# Patient Record
Sex: FEMALE | Race: ASIAN | NOT HISPANIC OR LATINO
[De-identification: names, ages, dates, MRNs, and addresses within clinical notes are randomized per-mention and may not be internally consistent; named-entity substitution may affect disease eponyms.]

---

## 2017-04-28 ENCOUNTER — RX RENEWAL (OUTPATIENT)
Age: 37
End: 2017-04-28

## 2017-05-10 ENCOUNTER — APPOINTMENT (OUTPATIENT)
Dept: ENDOCRINOLOGY | Facility: CLINIC | Age: 37
End: 2017-05-10

## 2017-11-15 ENCOUNTER — CLINICAL ADVICE (OUTPATIENT)
Age: 37
End: 2017-11-15

## 2017-11-15 ENCOUNTER — INPATIENT (INPATIENT)
Facility: HOSPITAL | Age: 37
LOS: 0 days | Discharge: HOME | End: 2017-11-16
Attending: INTERNAL MEDICINE | Admitting: INTERNAL MEDICINE
Payer: MEDICAID

## 2017-11-15 VITALS
RESPIRATION RATE: 18 BRPM | DIASTOLIC BLOOD PRESSURE: 72 MMHG | TEMPERATURE: 98 F | HEART RATE: 112 BPM | WEIGHT: 90.39 LBS | SYSTOLIC BLOOD PRESSURE: 140 MMHG

## 2017-11-15 DIAGNOSIS — E03.9 HYPOTHYROIDISM, UNSPECIFIED: ICD-10-CM

## 2017-11-15 DIAGNOSIS — E10.3599 TYPE 1 DIABETES MELLITUS WITH PROLIFERATIVE DIABETIC RETINOPATHY WITHOUT MACULAR EDEMA, UNSPECIFIED EYE: ICD-10-CM

## 2017-11-15 DIAGNOSIS — E13.10 OTHER SPECIFIED DIABETES MELLITUS WITH KETOACIDOSIS WITHOUT COMA: ICD-10-CM

## 2017-11-15 DIAGNOSIS — E10.10 TYPE 1 DIABETES MELLITUS WITH KETOACIDOSIS WITHOUT COMA: ICD-10-CM

## 2017-11-15 LAB
ALBUMIN SERPL ELPH-MCNC: 4.5 G/DL — SIGNIFICANT CHANGE UP (ref 3.3–5)
ALP SERPL-CCNC: 91 U/L — SIGNIFICANT CHANGE UP (ref 40–120)
ALT FLD-CCNC: 11 U/L — SIGNIFICANT CHANGE UP (ref 4–33)
APPEARANCE UR: CLEAR — SIGNIFICANT CHANGE UP
AST SERPL-CCNC: 22 U/L — SIGNIFICANT CHANGE UP (ref 4–32)
B-OH-BUTYR SERPL-SCNC: 7.2 MMOL/L — HIGH (ref 0–0.4)
BASE EXCESS BLDV CALC-SCNC: -14.8 MMOL/L — SIGNIFICANT CHANGE UP
BASE EXCESS BLDV CALC-SCNC: -7.7 MMOL/L — SIGNIFICANT CHANGE UP
BASOPHILS # BLD AUTO: 0.06 K/UL — SIGNIFICANT CHANGE UP (ref 0–0.2)
BASOPHILS NFR BLD AUTO: 0.4 % — SIGNIFICANT CHANGE UP (ref 0–2)
BILIRUB SERPL-MCNC: 0.3 MG/DL — SIGNIFICANT CHANGE UP (ref 0.2–1.2)
BILIRUB UR-MCNC: NEGATIVE — SIGNIFICANT CHANGE UP
BLOOD GAS VENOUS - CREATININE: 0.47 MG/DL — LOW (ref 0.5–1.3)
BLOOD GAS VENOUS - CREATININE: SIGNIFICANT CHANGE UP MG/DL (ref 0.5–1.3)
BLOOD UR QL VISUAL: HIGH
BUN SERPL-MCNC: 12 MG/DL — SIGNIFICANT CHANGE UP (ref 7–23)
BUN SERPL-MCNC: 15 MG/DL — SIGNIFICANT CHANGE UP (ref 7–23)
BUN SERPL-MCNC: 23 MG/DL — SIGNIFICANT CHANGE UP (ref 7–23)
CALCIUM SERPL-MCNC: 7.4 MG/DL — LOW (ref 8.4–10.5)
CALCIUM SERPL-MCNC: 7.9 MG/DL — LOW (ref 8.4–10.5)
CALCIUM SERPL-MCNC: 9.5 MG/DL — SIGNIFICANT CHANGE UP (ref 8.4–10.5)
CHLORIDE BLDV-SCNC: 108 MMOL/L — SIGNIFICANT CHANGE UP (ref 96–108)
CHLORIDE BLDV-SCNC: 114 MMOL/L — HIGH (ref 96–108)
CHLORIDE SERPL-SCNC: 106 MMOL/L — SIGNIFICANT CHANGE UP (ref 98–107)
CHLORIDE SERPL-SCNC: 107 MMOL/L — SIGNIFICANT CHANGE UP (ref 98–107)
CHLORIDE SERPL-SCNC: 96 MMOL/L — LOW (ref 98–107)
CO2 SERPL-SCNC: 15 MMOL/L — LOW (ref 22–31)
CO2 SERPL-SCNC: 19 MMOL/L — LOW (ref 22–31)
CO2 SERPL-SCNC: 9 MMOL/L — CRITICAL LOW (ref 22–31)
COLOR SPEC: SIGNIFICANT CHANGE UP
CREAT SERPL-MCNC: 0.47 MG/DL — LOW (ref 0.5–1.3)
CREAT SERPL-MCNC: 0.59 MG/DL — SIGNIFICANT CHANGE UP (ref 0.5–1.3)
CREAT SERPL-MCNC: 0.8 MG/DL — SIGNIFICANT CHANGE UP (ref 0.5–1.3)
EOSINOPHIL # BLD AUTO: 0 K/UL — SIGNIFICANT CHANGE UP (ref 0–0.5)
EOSINOPHIL NFR BLD AUTO: 0 % — SIGNIFICANT CHANGE UP (ref 0–6)
GAS PNL BLDV: 133 MMOL/L — LOW (ref 136–146)
GAS PNL BLDV: 134 MMOL/L — LOW (ref 136–146)
GLUCOSE BLDC GLUCOMTR-MCNC: 111 MG/DL — HIGH (ref 70–99)
GLUCOSE BLDC GLUCOMTR-MCNC: 131 MG/DL — HIGH (ref 70–99)
GLUCOSE BLDC GLUCOMTR-MCNC: 146 MG/DL — HIGH (ref 70–99)
GLUCOSE BLDC GLUCOMTR-MCNC: 148 MG/DL — HIGH (ref 70–99)
GLUCOSE BLDC GLUCOMTR-MCNC: 153 MG/DL — HIGH (ref 70–99)
GLUCOSE BLDC GLUCOMTR-MCNC: 184 MG/DL — HIGH (ref 70–99)
GLUCOSE BLDV-MCNC: 130 — HIGH (ref 70–99)
GLUCOSE BLDV-MCNC: 396 — HIGH (ref 70–99)
GLUCOSE SERPL-MCNC: 131 MG/DL — HIGH (ref 70–99)
GLUCOSE SERPL-MCNC: 178 MG/DL — HIGH (ref 70–99)
GLUCOSE SERPL-MCNC: 401 MG/DL — HIGH (ref 70–99)
GLUCOSE UR-MCNC: >1000 — SIGNIFICANT CHANGE UP
HCO3 BLDV-SCNC: 13 MMOL/L — LOW (ref 20–27)
HCO3 BLDV-SCNC: 17 MMOL/L — LOW (ref 20–27)
HCT VFR BLD CALC: 47.6 % — HIGH (ref 34.5–45)
HCT VFR BLDV CALC: 38.3 % — SIGNIFICANT CHANGE UP (ref 34.5–45)
HCT VFR BLDV CALC: 42.5 % — SIGNIFICANT CHANGE UP (ref 34.5–45)
HGB BLD-MCNC: 14.9 G/DL — SIGNIFICANT CHANGE UP (ref 11.5–15.5)
HGB BLDV-MCNC: 12.5 G/DL — SIGNIFICANT CHANGE UP (ref 11.5–15.5)
HGB BLDV-MCNC: 13.9 G/DL — SIGNIFICANT CHANGE UP (ref 11.5–15.5)
IMM GRANULOCYTES # BLD AUTO: 0.09 # — SIGNIFICANT CHANGE UP
IMM GRANULOCYTES NFR BLD AUTO: 0.6 % — SIGNIFICANT CHANGE UP (ref 0–1.5)
KETONES UR-MCNC: SIGNIFICANT CHANGE UP
LACTATE BLDV-MCNC: 1.5 MMOL/L — SIGNIFICANT CHANGE UP (ref 0.5–2)
LACTATE BLDV-MCNC: 2 MMOL/L — SIGNIFICANT CHANGE UP (ref 0.5–2)
LEUKOCYTE ESTERASE UR-ACNC: NEGATIVE — SIGNIFICANT CHANGE UP
LYMPHOCYTES # BLD AUTO: 0.94 K/UL — LOW (ref 1–3.3)
LYMPHOCYTES # BLD AUTO: 6.1 % — LOW (ref 13–44)
MAGNESIUM SERPL-MCNC: 1.4 MG/DL — LOW (ref 1.6–2.6)
MAGNESIUM SERPL-MCNC: 1.7 MG/DL — SIGNIFICANT CHANGE UP (ref 1.6–2.6)
MCHC RBC-ENTMCNC: 31.3 % — LOW (ref 32–36)
MCHC RBC-ENTMCNC: 31.4 PG — SIGNIFICANT CHANGE UP (ref 27–34)
MCV RBC AUTO: 100.2 FL — HIGH (ref 80–100)
MONOCYTES # BLD AUTO: 0.25 K/UL — SIGNIFICANT CHANGE UP (ref 0–0.9)
MONOCYTES NFR BLD AUTO: 1.6 % — LOW (ref 2–14)
NEUTROPHILS # BLD AUTO: 14.18 K/UL — HIGH (ref 1.8–7.4)
NEUTROPHILS NFR BLD AUTO: 91.3 % — HIGH (ref 43–77)
NITRITE UR-MCNC: NEGATIVE — SIGNIFICANT CHANGE UP
NRBC # FLD: 0 — SIGNIFICANT CHANGE UP
PCO2 BLDV: 35 MMHG — LOW (ref 41–51)
PCO2 BLDV: 39 MMHG — LOW (ref 41–51)
PH BLDV: 7.17 PH — CRITICAL LOW (ref 7.32–7.43)
PH BLDV: 7.28 PH — LOW (ref 7.32–7.43)
PH UR: 5.5 — SIGNIFICANT CHANGE UP (ref 4.6–8)
PHOSPHATE SERPL-MCNC: 2.1 MG/DL — LOW (ref 2.5–4.5)
PHOSPHATE SERPL-MCNC: 2.5 MG/DL — SIGNIFICANT CHANGE UP (ref 2.5–4.5)
PLATELET # BLD AUTO: 300 K/UL — SIGNIFICANT CHANGE UP (ref 150–400)
PMV BLD: 9 FL — SIGNIFICANT CHANGE UP (ref 7–13)
PO2 BLDV: 56 MMHG — HIGH (ref 35–40)
PO2 BLDV: < 24 MMHG — LOW (ref 35–40)
POTASSIUM BLDV-SCNC: 4.1 MMOL/L — SIGNIFICANT CHANGE UP (ref 3.4–4.5)
POTASSIUM BLDV-SCNC: 4.7 MMOL/L — HIGH (ref 3.4–4.5)
POTASSIUM SERPL-MCNC: 3.9 MMOL/L — SIGNIFICANT CHANGE UP (ref 3.5–5.3)
POTASSIUM SERPL-MCNC: 4.5 MMOL/L — SIGNIFICANT CHANGE UP (ref 3.5–5.3)
POTASSIUM SERPL-MCNC: 5.2 MMOL/L — SIGNIFICANT CHANGE UP (ref 3.5–5.3)
POTASSIUM SERPL-SCNC: 3.9 MMOL/L — SIGNIFICANT CHANGE UP (ref 3.5–5.3)
POTASSIUM SERPL-SCNC: 4.5 MMOL/L — SIGNIFICANT CHANGE UP (ref 3.5–5.3)
POTASSIUM SERPL-SCNC: 5.2 MMOL/L — SIGNIFICANT CHANGE UP (ref 3.5–5.3)
PROT SERPL-MCNC: 8.7 G/DL — HIGH (ref 6–8.3)
PROT UR-MCNC: 20 — SIGNIFICANT CHANGE UP
RBC # BLD: 4.75 M/UL — SIGNIFICANT CHANGE UP (ref 3.8–5.2)
RBC # FLD: 11.6 % — SIGNIFICANT CHANGE UP (ref 10.3–14.5)
RBC CASTS # UR COMP ASSIST: >50 — HIGH (ref 0–?)
SAO2 % BLDV: 26.1 % — LOW (ref 60–85)
SAO2 % BLDV: 83.6 % — SIGNIFICANT CHANGE UP (ref 60–85)
SODIUM SERPL-SCNC: 133 MMOL/L — LOW (ref 135–145)
SODIUM SERPL-SCNC: 137 MMOL/L — SIGNIFICANT CHANGE UP (ref 135–145)
SODIUM SERPL-SCNC: 140 MMOL/L — SIGNIFICANT CHANGE UP (ref 135–145)
SP GR SPEC: 1.03 — SIGNIFICANT CHANGE UP (ref 1–1.03)
SQUAMOUS # UR AUTO: SIGNIFICANT CHANGE UP
TROPONIN T SERPL-MCNC: < 0.06 NG/ML — SIGNIFICANT CHANGE UP (ref 0–0.06)
UROBILINOGEN FLD QL: NORMAL E.U. — SIGNIFICANT CHANGE UP (ref 0.1–0.2)
WBC # BLD: 15.52 K/UL — HIGH (ref 3.8–10.5)
WBC # FLD AUTO: 15.52 K/UL — HIGH (ref 3.8–10.5)
WBC UR QL: SIGNIFICANT CHANGE UP (ref 0–?)

## 2017-11-15 PROCEDURE — 71020: CPT | Mod: 26

## 2017-11-15 PROCEDURE — 99255 IP/OBS CONSLTJ NEW/EST HI 80: CPT | Mod: GC

## 2017-11-15 PROCEDURE — 99291 CRITICAL CARE FIRST HOUR: CPT

## 2017-11-15 RX ORDER — SODIUM CHLORIDE 9 MG/ML
1000 INJECTION, SOLUTION INTRAVENOUS ONCE
Qty: 0 | Refills: 0 | Status: COMPLETED | OUTPATIENT
Start: 2017-11-15 | End: 2017-11-15

## 2017-11-15 RX ORDER — INSULIN HUMAN 100 [IU]/ML
3 INJECTION, SOLUTION SUBCUTANEOUS
Qty: 100 | Refills: 0 | Status: DISCONTINUED | OUTPATIENT
Start: 2017-11-15 | End: 2017-11-16

## 2017-11-15 RX ORDER — ATORVASTATIN CALCIUM 80 MG/1
1 TABLET, FILM COATED ORAL
Qty: 0 | Refills: 0 | COMMUNITY

## 2017-11-15 RX ORDER — SODIUM CHLORIDE 9 MG/ML
1000 INJECTION, SOLUTION INTRAVENOUS
Qty: 0 | Refills: 0 | Status: DISCONTINUED | OUTPATIENT
Start: 2017-11-15 | End: 2017-11-16

## 2017-11-15 RX ORDER — INSULIN LISPRO 100/ML
VIAL (ML) SUBCUTANEOUS
Qty: 0 | Refills: 0 | Status: DISCONTINUED | OUTPATIENT
Start: 2017-11-15 | End: 2017-11-16

## 2017-11-15 RX ORDER — SODIUM CHLORIDE 9 MG/ML
2000 INJECTION, SOLUTION INTRAVENOUS ONCE
Qty: 0 | Refills: 0 | Status: DISCONTINUED | OUTPATIENT
Start: 2017-11-15 | End: 2017-11-15

## 2017-11-15 RX ORDER — ENOXAPARIN SODIUM 100 MG/ML
40 INJECTION SUBCUTANEOUS EVERY 24 HOURS
Qty: 0 | Refills: 0 | Status: DISCONTINUED | OUTPATIENT
Start: 2017-11-15 | End: 2017-11-16

## 2017-11-15 RX ORDER — DEXTROSE 50 % IN WATER 50 %
12.5 SYRINGE (ML) INTRAVENOUS ONCE
Qty: 0 | Refills: 0 | Status: DISCONTINUED | OUTPATIENT
Start: 2017-11-15 | End: 2017-11-16

## 2017-11-15 RX ORDER — GLUCAGON INJECTION, SOLUTION 0.5 MG/.1ML
1 INJECTION, SOLUTION SUBCUTANEOUS ONCE
Qty: 0 | Refills: 0 | Status: DISCONTINUED | OUTPATIENT
Start: 2017-11-15 | End: 2017-11-16

## 2017-11-15 RX ORDER — DEXTROSE 50 % IN WATER 50 %
25 SYRINGE (ML) INTRAVENOUS ONCE
Qty: 0 | Refills: 0 | Status: DISCONTINUED | OUTPATIENT
Start: 2017-11-15 | End: 2017-11-16

## 2017-11-15 RX ORDER — INSULIN LISPRO 100/ML
6 VIAL (ML) SUBCUTANEOUS
Qty: 0 | Refills: 0 | Status: DISCONTINUED | OUTPATIENT
Start: 2017-11-15 | End: 2017-11-16

## 2017-11-15 RX ORDER — INSULIN GLARGINE 100 [IU]/ML
20 INJECTION, SOLUTION SUBCUTANEOUS AT BEDTIME
Qty: 0 | Refills: 0 | Status: DISCONTINUED | OUTPATIENT
Start: 2017-11-15 | End: 2017-11-16

## 2017-11-15 RX ORDER — SODIUM CHLORIDE 9 MG/ML
2000 INJECTION INTRAMUSCULAR; INTRAVENOUS; SUBCUTANEOUS ONCE
Qty: 0 | Refills: 0 | Status: COMPLETED | OUTPATIENT
Start: 2017-11-15 | End: 2017-11-15

## 2017-11-15 RX ORDER — ATORVASTATIN CALCIUM 80 MG/1
80 TABLET, FILM COATED ORAL AT BEDTIME
Qty: 0 | Refills: 0 | Status: DISCONTINUED | OUTPATIENT
Start: 2017-11-15 | End: 2017-11-16

## 2017-11-15 RX ORDER — DEXTROSE 50 % IN WATER 50 %
1 SYRINGE (ML) INTRAVENOUS ONCE
Qty: 0 | Refills: 0 | Status: DISCONTINUED | OUTPATIENT
Start: 2017-11-15 | End: 2017-11-16

## 2017-11-15 RX ORDER — SODIUM CHLORIDE 9 MG/ML
1000 INJECTION, SOLUTION INTRAVENOUS
Qty: 0 | Refills: 0 | Status: DISCONTINUED | OUTPATIENT
Start: 2017-11-15 | End: 2017-11-15

## 2017-11-15 RX ORDER — INSULIN HUMAN 100 [IU]/ML
4 INJECTION, SOLUTION SUBCUTANEOUS
Qty: 100 | Refills: 0 | Status: DISCONTINUED | OUTPATIENT
Start: 2017-11-15 | End: 2017-11-15

## 2017-11-15 RX ADMIN — SODIUM CHLORIDE 2000 MILLILITER(S): 9 INJECTION INTRAMUSCULAR; INTRAVENOUS; SUBCUTANEOUS at 14:34

## 2017-11-15 RX ADMIN — INSULIN HUMAN 2 UNIT(S)/HR: 100 INJECTION, SOLUTION SUBCUTANEOUS at 17:50

## 2017-11-15 RX ADMIN — ENOXAPARIN SODIUM 40 MILLIGRAM(S): 100 INJECTION SUBCUTANEOUS at 18:50

## 2017-11-15 RX ADMIN — INSULIN HUMAN 3 UNIT(S)/HR: 100 INJECTION, SOLUTION SUBCUTANEOUS at 21:57

## 2017-11-15 RX ADMIN — INSULIN HUMAN 4 UNIT(S)/HR: 100 INJECTION, SOLUTION SUBCUTANEOUS at 15:30

## 2017-11-15 RX ADMIN — SODIUM CHLORIDE 125 MILLILITER(S): 9 INJECTION, SOLUTION INTRAVENOUS at 17:50

## 2017-11-15 RX ADMIN — Medication 5 MILLIGRAM(S): at 18:50

## 2017-11-15 RX ADMIN — SODIUM CHLORIDE 125 MILLILITER(S): 9 INJECTION, SOLUTION INTRAVENOUS at 18:51

## 2017-11-15 RX ADMIN — SODIUM CHLORIDE 2000 MILLILITER(S): 9 INJECTION, SOLUTION INTRAVENOUS at 20:34

## 2017-11-15 RX ADMIN — ATORVASTATIN CALCIUM 80 MILLIGRAM(S): 80 TABLET, FILM COATED ORAL at 22:09

## 2017-11-15 RX ADMIN — INSULIN HUMAN 2 UNIT(S)/HR: 100 INJECTION, SOLUTION SUBCUTANEOUS at 18:51

## 2017-11-15 RX ADMIN — SODIUM CHLORIDE 200 MILLILITER(S): 9 INJECTION, SOLUTION INTRAVENOUS at 20:35

## 2017-11-15 RX ADMIN — SODIUM CHLORIDE 2000 MILLILITER(S): 9 INJECTION, SOLUTION INTRAVENOUS at 20:33

## 2017-11-15 RX ADMIN — INSULIN GLARGINE 20 UNIT(S): 100 INJECTION, SOLUTION SUBCUTANEOUS at 23:39

## 2017-11-15 RX ADMIN — INSULIN HUMAN 2 UNIT(S)/HR: 100 INJECTION, SOLUTION SUBCUTANEOUS at 20:35

## 2017-11-15 NOTE — CONSULT NOTE ADULT - ASSESSMENT
37 y/o F with uncontrolled DM1, hypothyroidism, HLD, here with DKA likely due to lack of insulin delivery via pump (as pump site was not changed appropriately)

## 2017-11-15 NOTE — H&P ADULT - NSHPPHYSICALEXAM_GEN_ALL_CORE
T(C): 36.8 (11-15-17 @ 13:36), Max: 36.8 (11-15-17 @ 13:36)  HR: 113 (11-15-17 @ 14:43) (112 - 113)  BP: 138/86 (11-15-17 @ 14:43) (138/86 - 140/72)  RR: 18 (11-15-17 @ 14:43) (18 - 18)  SpO2: 100% (11-15-17 @ 14:43) (100% - 100%)    Gen: awake, alert  HENT: neck soft / supple; MMM  Lymph: no LAD noted in neck  Eye: PERRL, sclerae anicteric  CV: normal rate, regular rhythm  Pulm: CTAB, no w/r/r auscultated  Abd: +BS, soft, NT, ND  Skin: warm, dry  Ext: no LE edema  Neuro: answering questions appropriately, following commands appropriately, recent and remote memory intact  Psych: normal mood / affect

## 2017-11-15 NOTE — CONSULT NOTE ADULT - PROBLEM SELECTOR RECOMMENDATION 2
- patient with poorly controlled DM1  - admit to MICU on insulin gtt for DKA  - will resume insulin pump once DKA resolved

## 2017-11-15 NOTE — H&P ADULT - ATTENDING COMMENTS
Patient with hx as above p/w DKA after insulin pump malfunction. AG 28, glucose 400s, HCO3 9, mild leukocytosis, normal lactate. She is afebrile. BP 120s-140s systolic. She is awake and alert, appears hypovolemic on exam. She received a 2L bolus in the ED and was initiated on an insulin gtt.    Impression  1.) Type 1 DM with DKA  2.) AGMA secondary to above.  3.) Hypovolemia secondary to above.    Plan  1.) Cont insulin gtt as per protocol until AG normalizes.  2.) Monitor finger sticks Q1H until off the insulin gtt.  3.) Once finger stick < 250, change maintenance IVF to dextrose.  4.) Will give another 2 liters of IVF bolus overnight.  5.) NPO until AG closes, advance diet afterwards.  6.) Transition to SC insulin once AG has closed, f/u endocrine recs.  7.) Pan culture if spikes a temp.  8.) Admit to the MICU for further management of above.  9.) Full code.    35 minutes of critical care time exclusive of all procedures.

## 2017-11-15 NOTE — ED ADULT NURSE NOTE - OBJECTIVE STATEMENT
pt presents to ED hx of kidney disease and DM, currently on an insulin pump. has had ICU admission for DKA in the past. pt states she was on the train yesterday when she began to feel ill, nauseated, tired, and checked her BGL and it was in the 400's. pt sates she did not adjust her basil rate changed her infusion site and the BGL was still in the 400's today, came to ED today d/t hyperglycemia general malaise and nausea. pt denies LOC denies abd pain on palpatino + thurst, denies dysuria. very difficult IV access. IV 24 G R hand labs drawn, TQ removed NS infusing. pt states she turned off her pump when she came to the ED today.

## 2017-11-15 NOTE — ED PROVIDER NOTE - ATTENDING CONTRIBUTION TO CARE
agree with resident note  36 yr old female with hx of IDDM presents with following hx; has insulin pump and continuous glucose monitor.  the monitor became dysfunctional approximately 1 month ago.  Pt was not frequently checking her fingersticks.  2 days ago started having dizziness, vomiting, not feeling well.  this continued until this morning when she checked her FS at work and was above 400.    PE: not toxic appearing; VSS; dry mucous membranes; CTAB/L; s1 s2 no m/r/g abd soft/NT/ND ext: no edema

## 2017-11-15 NOTE — CONSULT NOTE ADULT - ATTENDING COMMENTS
36F DM1 uncontrolled p/w DKA while using insulin pump. Per history patient not changing infusion site regularly which likely led to inadequate receipt of insulin from pump. For now agree with IV insulin, DKA protocol per MICU. Once stabilized transition to basal bolus insulin. Resume insulin pump for dc. DM education on proper use of insulin pump.

## 2017-11-15 NOTE — ED ADULT TRIAGE NOTE - CHIEF COMPLAINT QUOTE
Pt with DM type 1, c/o high blood sugars and vomiting since yesterday. Denies pain.  in triage. Pt states she was also told her HA1C is over 9 per latest labs. Pt has insulin pump. Unable to obtain O2 sat in triage, pt denies SOB, no respiratory distress noted

## 2017-11-15 NOTE — CONSULT NOTE ADULT - SUBJECTIVE AND OBJECTIVE BOX
HPI:  35 y/o F h/o DM1 (with insulin pump), hypothyroidism, HLD here with N/V.    The patient is seen by Dr. Lara.  She has not seen her in about a year.  She had started an insulin pump 1 year ago.  She states that she changes her pump every 3-5 days, but she reports that she did not change her pump for 8 days.  She changed it last night.  The patient also reports that she used to do CGM but that her monitor broke a few months ago.  Since then, she has only been checking her FS 2-3 times per week, and her FS have been in the 200s when she has checked.  On her way to work today, she began to feel nauseous, with emesis x3 and decided to present to the ED. Patient admitted to MICU for DKA.    Endocrine history:  Patient was diagnosed with DM1 when she was 3-4 years old. Has been using Animus pump for a little over a year. Her endocrinologist is Dr. Rodriguez, but has not seen her in over a year. Hba1c 9.2. Patient had a Dexcom sensor, however it broke a month ago. Since then, has been checking FS 2-3x a day, usually in the middle of the day before a meal, FS usually in 200's. Saw optho, has retinopathy. Has nephropathy but no neuropathy.     Patient has not changed her pump site in 8 days. Yesterday evening, she noticed her pump site was painful, and then switched the site. FS this AM was almost 500, patient attempted to correct with insulin pump, but had nausea/vomiting. She called our office and we instructed her to come to ED. No longer wearing insulin pump.     Pump settings:  Basal: 0.75 units/hr  ISF: 1:40  ICR: 1:7  Blood glucose target 120    Patient has been estimating how much carbs she is eating and then manually bolusing with her pump.     No longer on synthroid (was previously on 25 mcg, but was discontinued as TSH was 3.19 and antibodies were negative.      PAST MEDICAL & SURGICAL HISTORY:  Hyperlipidemia, unspecified hyperlipidemia  Acquired hypothyroidism  Diabetes mellitus type I  No significant past surgical history      FAMILY HISTORY:  Family history of diabetes mellitus 2 in father (Father)      Social History:  Former smoker, quit years ago  No alcohol use  Works as a     Outpatient Medications:  · 	levofloxacin 500 mg oral tablet: 1 tab(s) orally every 24 hours x 1 day   · 	acetaminophen 325 mg oral tablet: 2 tab(s) orally every 6 hours, As needed, For Temp over 37.9 C (100.2 F)  · 	potassium phosphate-sodium phosphate 305 mg-700 mg oral tablet: 1 tab(s) orally 4 times a day (with meals and at bedtime)  · 	HumaLOG 100 units/mL subcutaneous solution: 10 unit(s) subcutaneous 3 times a day (before meals)  · 	Synthroid 25 mcg (0.025 mg) oral tablet: 1 tab(s) orally once a day  · 	Lantus 100 units/mL subcutaneous solution: 20 unit(s) subcutaneous once a day in the morning     MEDICATIONS  (STANDING):  insulin Infusion 4 Unit(s)/Hr (4 mL/Hr) IV Continuous <Continuous>    MEDICATIONS  (PRN):      Allergies  No Known Allergies        Review of Systems:  Constitutional: No fever  Eyes: No blurry vision  Neuro: No tremors  HEENT: No pain  Cardiovascular: No chest pain, palpitations  Respiratory: No SOB, no cough  GI: + nausea, vomiting; no abdominal pain  : No dysuria  Skin: no rash  Psych: no depression  Endocrine: no polyuria, + polydipsia  Hem/lymph: no swelling      ALL OTHER SYSTEMS REVIEWED AND NEGATIVE      PHYSICAL EXAM:  VITALS: T(C): 36.8 (11-15-17 @ 13:36)  T(F): 98.3 (11-15-17 @ 13:36), Max: 98.3 (11-15-17 @ 13:36)  HR: 113 (11-15-17 @ 14:43) (112 - 113)  BP: 138/86 (11-15-17 @ 14:43) (138/86 - 140/72)  RR:  (18 - 18)  SpO2:  (100% - 100%)  Wt(kg): --  GENERAL: NAD, well-developed  EYES: No proptosis, anicteric  HEENT:  Atraumatic, Normocephalic  RESPIRATORY: Clear to auscultation bilaterally; No rales, rhonchi, wheezing  CARDIOVASCULAR: Regular rate and rhythm; No murmurs; no peripheral edema  GI: Soft, nontender, non distended, normal bowel sounds  SKIN: Dry, intact; insulin pump site on right hip firm to palpation  MUSCULOSKELETAL: Full range of motion, normal strength  NEURO: extraocular movements intact, no tremor  PSYCH: Alert and oriented x 3, reactive affect, euthymic mood        POCT Blood Glucose.: 410 mg/dL (11-15-17 @ 13:33)                          14.9   15.52 )-----------( 300      ( 15 Nov 2017 14:34 )             47.6       11-15    133<L>  |  96<L>  |  23  ----------------------------<  401<H>  5.2   |  9<LL>  |  0.80    EGFR if : 109  EGFR if non : 94    Ca    9.5      11-15    TPro  8.7<H>  /  Alb  4.5  /  TBili  0.3  /  DBili  x   /  AST  22  /  ALT  11  /  AlkPhos  91  11-15      Thyroid Function Tests: HPI:  35 y/o F h/o DM1 (with insulin pump), hypothyroidism, HLD here with N/V.    The patient is seen by Dr. Rodriguez.  She has not seen her in about a year.  She had started an insulin pump 1 year ago.  She states that she changes her pump every 3-5 days, but she reports that she did not change her pump for 8 days.  She changed it last night.  The patient also reports that she used to do CGM but that her monitor broke a few months ago.  Since then, she has only been checking her FS 2-3 times per week, and her FS have been in the 200s when she has checked.  On her way to work today, she began to feel nauseous, with emesis x3 and decided to present to the ED. Patient admitted to MICU for DKA.    Endocrine history:  Patient was diagnosed with DM1 when she was 3-4 years old. Has been using Animus pump for a little over a year. Her endocrinologist is Dr. Rodriguez, but has not seen her in over a year. Hba1c 9.2. Patient had a Dexcom sensor, however it broke a month ago. Since then, has been checking FS 2-3x a day, usually in the middle of the day before a meal, FS usually in 200's. Saw optho, has retinopathy. Has nephropathy but no neuropathy.     Patient has not changed her pump site in 8 days. Yesterday evening, she noticed her pump site was painful, and then switched the site. FS this AM was almost 500, patient attempted to correct with insulin pump, but had nausea/vomiting. She called our office and we instructed her to come to ED. No longer wearing insulin pump.     Pump settings:  Basal: 0.75 units/hr  ISF: 1:40  ICR: 1:7  Blood glucose target 120    Patient has been estimating how much carbs she is eating and then manually bolusing with her pump.     No longer on synthroid (was previously on 25 mcg, but was discontinued as TSH was 3.19 and antibodies were negative.      PAST MEDICAL & SURGICAL HISTORY:  Hyperlipidemia, unspecified hyperlipidemia  Acquired hypothyroidism  Diabetes mellitus type I  No significant past surgical history      FAMILY HISTORY:  Family history of diabetes mellitus 2 in father (Father)      Social History:  Former smoker, quit years ago  No alcohol use  Works as a     Outpatient Medications:  · 	levofloxacin 500 mg oral tablet: 1 tab(s) orally every 24 hours x 1 day   · 	acetaminophen 325 mg oral tablet: 2 tab(s) orally every 6 hours, As needed, For Temp over 37.9 C (100.2 F)  · 	potassium phosphate-sodium phosphate 305 mg-700 mg oral tablet: 1 tab(s) orally 4 times a day (with meals and at bedtime)  · 	HumaLOG 100 units/mL subcutaneous solution: 10 unit(s) subcutaneous 3 times a day (before meals)  · 	Synthroid 25 mcg (0.025 mg) oral tablet: 1 tab(s) orally once a day  · 	Lantus 100 units/mL subcutaneous solution: 20 unit(s) subcutaneous once a day in the morning     MEDICATIONS  (STANDING):  insulin Infusion 4 Unit(s)/Hr (4 mL/Hr) IV Continuous <Continuous>    MEDICATIONS  (PRN):      Allergies  No Known Allergies        Review of Systems:  Constitutional: No fever  Eyes: No blurry vision  Neuro: No tremors  HEENT: No pain  Cardiovascular: No chest pain, palpitations  Respiratory: No SOB, no cough  GI: + nausea, vomiting; no abdominal pain  : No dysuria  Skin: no rash  Psych: no depression  Endocrine: no polyuria, + polydipsia  Hem/lymph: no swelling      ALL OTHER SYSTEMS REVIEWED AND NEGATIVE      PHYSICAL EXAM:  VITALS: T(C): 36.8 (11-15-17 @ 13:36)  T(F): 98.3 (11-15-17 @ 13:36), Max: 98.3 (11-15-17 @ 13:36)  HR: 113 (11-15-17 @ 14:43) (112 - 113)  BP: 138/86 (11-15-17 @ 14:43) (138/86 - 140/72)  RR:  (18 - 18)  SpO2:  (100% - 100%)  Wt(kg): --  GENERAL: NAD, well-developed  EYES: No proptosis, anicteric  HEENT:  Atraumatic, Normocephalic  RESPIRATORY: Clear to auscultation bilaterally; No rales, rhonchi, wheezing  CARDIOVASCULAR: Regular rate and rhythm; No murmurs; no peripheral edema  GI: Soft, nontender, non distended, normal bowel sounds  SKIN: Dry, intact; insulin pump site on right hip firm to palpation  MUSCULOSKELETAL: Full range of motion, normal strength  NEURO: extraocular movements intact, no tremor  PSYCH: Alert and oriented x 3, reactive affect, euthymic mood        POCT Blood Glucose.: 410 mg/dL (11-15-17 @ 13:33)                          14.9   15.52 )-----------( 300      ( 15 Nov 2017 14:34 )             47.6       11-15    133<L>  |  96<L>  |  23  ----------------------------<  401<H>  5.2   |  9<LL>  |  0.80    EGFR if : 109  EGFR if non : 94    Ca    9.5      11-15    TPro  8.7<H>  /  Alb  4.5  /  TBili  0.3  /  DBili  x   /  AST  22  /  ALT  11  /  AlkPhos  91  11-15      Thyroid Function Tests:

## 2017-11-15 NOTE — CONSULT NOTE ADULT - PROBLEM SELECTOR PROBLEM 2
Uncontrolled type 1 diabetes mellitus with proliferative retinopathy, unspecified laterality, unspecified proliferative retinopathy type

## 2017-11-15 NOTE — H&P ADULT - HISTORY OF PRESENT ILLNESS
36F h/o DM2 (on insulin pump) here with N/V/abd pain.    The patient is seen by     In the ED, Tmax 98.3F, -113, -140/72-86, SpO2 100% RA.  WBC 15.52, Hgb 14.9, plt 300, Na 133, Cl 96, bicarb 9, AG 28, Cr 0.80, glc 401. 36F h/o DM2 (on insulin pump) here with N/V/abd pain.    The patient is seen by Dr. Lara.  She has not seen her in about a year.  She had started an insulin pump 1 year ago.  She states that she changes her pump every 3-5 days, but she reports that she did not change her pump for 8 days.  She changed it last night.  The patient also reports that she used to do CGM but that her monitor broke a few months ago.  Since then, she has only been checking her FS 2-3 times per week, and her FS have been in the 200s when she has checked.  On her way to work today, she began to feel nauseous, with emesis and abdominal pain and decided to present to the ED.    In the ED, Tmax 98.3F, -113, -140/72-86, SpO2 100% RA.  WBC 15.52, Hgb 14.9, plt 300, Na 133, Cl 96, bicarb 9, AG 28, Cr 0.80, glc 401. 36F h/o DM1 (with insulin pump), hypothyroidism, HLD here with N/V/abd pain.    The patient is seen by Dr. Lara.  She has not seen her in about a year.  She had started an insulin pump 1 year ago.  She states that she changes her pump every 3-5 days, but she reports that she did not change her pump for 8 days.  She changed it last night.  The patient also reports that she used to do CGM but that her monitor broke a few months ago.  Since then, she has only been checking her FS 2-3 times per week, and her FS have been in the 200s when she has checked.  On her way to work today, she began to feel nauseous, with emesis and abdominal pain and decided to present to the ED.    In the ED, Tmax 98.3F, -113, -140/72-86, SpO2 100% RA.  WBC 15.52, Hgb 14.9, plt 300, Na 133, Cl 96, bicarb 9, AG 28, Cr 0.80, glc 401. 36F h/o DM1 (with insulin pump), hypothyroidism, HLD here with N/V.    The patient is seen by Dr. Lara.  She has not seen her in about a year.  She had started an insulin pump 1 year ago.  She states that she changes her pump every 3-5 days, but she reports that she did not change her pump for 8 days.  She changed it last night.  The patient also reports that she used to do CGM but that her monitor broke a few months ago.  Since then, she has only been checking her FS 2-3 times per week, and her FS have been in the 200s when she has checked.  On her way to work today, she began to feel nauseous, with emesis x3 and decided to present to the ED.    In the ED, Tmax 98.3F, -113, -140/72-86, SpO2 100% RA.  WBC 15.52, Hgb 14.9, plt 300, Na 133, Cl 96, bicarb 9, AG 28, Cr 0.80, glc 401.

## 2017-11-15 NOTE — ED PROVIDER NOTE - MEDICAL DECISION MAKING DETAILS
35yo female h/o DM1, h/o DKA with vomiting and hyperglycemia, likely DKA, labs, fluids, insulin and MICU if in DKA

## 2017-11-15 NOTE — H&P ADULT - NSHPREVIEWOFSYSTEMS_GEN_ALL_CORE
Gen: denies fever, chills  HENT: denies throat pain, nasal congestion  Eye: denies changes in vision, eye pain  CV: denies chest pain, palpitations  Pulm: denies SOB, cough  Abd: + abd pain, N/V, denies D/C  : denies hematuria, dysuria  Skin: denies rash, itching  Ext: denies LE edema, pain  Neuro: denies HA, dizziness, lightheadedness Gen: denies fever, chills  HENT: denies throat pain, nasal congestion  Eye: denies changes in vision, eye pain  CV: denies chest pain, palpitations  Pulm: denies SOB, cough  Abd: + N/V, denies abd pain, D/C  : denies hematuria, dysuria  Skin: denies rash, itching  Ext: denies LE edema, pain  Neuro: denies HA, dizziness, lightheadedness

## 2017-11-15 NOTE — ED PROVIDER NOTE - CRITICAL CARE PROVIDED
direct patient care (not related to procedure)/interpretation of diagnostic studies/consultation with other physicians/additional history taking

## 2017-11-15 NOTE — ED ADULT NURSE REASSESSMENT NOTE - NS ED NURSE REASSESS COMMENT FT1
RN Break Coverage : Alert and oriented x 4. Pt received from TOOTIE Herring . Pt being admitted to ICU. VSS. TOOTIE Piper in ICU took report.

## 2017-11-15 NOTE — H&P ADULT - NSHPLABSRESULTS_GEN_ALL_CORE
.  Labs reviewed personally.                          14.9   15.52 )-----------( 300      ( 15 Nov 2017 14:34 )             47.6     Hgb Trend: 14.9<--  11-15    133<L>  |  96<L>  |  23  ----------------------------<  401<H>  5.2   |  9<LL>  |  0.80    Ca    9.5      15 Nov 2017 14:34    TPro  8.7<H>  /  Alb  4.5  /  TBili  0.3  /  DBili  x   /  AST  22  /  ALT  11  /  AlkPhos  91  11-15    Creatinine Trend: 0.80<--        No imaging.

## 2017-11-15 NOTE — CONSULT NOTE ADULT - PROBLEM SELECTOR RECOMMENDATION 9
- patient with DKA likely in setting on non-adherence - she should be changing her insulin pump site every 3 days, but did not change it for 8 days - it is likely that she was not receiving insulin via the tubing and her pump site does not appear to be intact as well  - given elevated AG and + BHB, recommend admit to MICU on insulin gtt as per DKA protocol  - check Fs q1 with BMP q4 hours; once anion gap closed x 2, will transition patient back to insulin pump  - will follow  - outpatient follow up with Dr. Rodriguez - [phone] - patient with DKA likely in setting on non-adherence - she should be changing her insulin pump site every 3 days, but did not change it for 8 days - it is likely that she was not receiving insulin via the tubing and her pump site does not appear to be intact as well  - given elevated AG and + BHB, recommend admit to MICU on insulin gtt as per DKA protocol  - check Fs q1 with BMP q4 hours; once anion gap closed x 2, will transition patient to basal bolus insulin and then back to insulin pump  - will follow  - outpatient follow up with Dr. Rodriguez - [phone]

## 2017-11-15 NOTE — H&P ADULT - ASSESSMENT
36F h/o DM1 (with insulin pump), hypothyroidism, HLD here with N/V/abd pain found to be in DKA.    #Neuro - mentating well, no acute issues    #CV - HDS    #Resp - no acute issues    #GI - NPO at this time    #Renal - Cr WNL    #ID - no active issues    #Endo - pt in DKA, will start on insulin drip at this time    #Heme - leukocytosis in the setting of DKA    #FEN - NPO while on insulin drip    #DVT PPX - enoxaparin    #Code Status - full code

## 2017-11-15 NOTE — ED PROVIDER NOTE - OBJECTIVE STATEMENT
35yo female h/o DM1 with insulin pump but has not had her continuous glucose monitor on, and has not been checking fingersticks consistently. Yesterday was vomiting, + intermittent abdominal pain. Checked fingerstick today and was 498. pt disconnected her insulin pump. + feeling hot, but no fevers, chills. + intermittent cough x 1 month nonproductive. No dysuria. + etoh use this weekend   Endo - Dr Rosmery Rodriguez - [phone] 37yo female h/o DM1 with insulin pump but has not had her continuous glucose monitor on, and has not been checking fingersticks consistently. Yesterday was vomiting, + intermittent abdominal pain. Checked fingerstick today and was 498. pt disconnected her insulin pump. + feeling hot, but no fevers, chills. + intermittent cough x 1 month nonproductive. No dysuria. + etoh use this weekend   Endo - Dr Rosmery Rodriguez - [phone]    meds: insulin, enalapril, simvastatin

## 2017-11-16 ENCOUNTER — TRANSCRIPTION ENCOUNTER (OUTPATIENT)
Age: 37
End: 2017-11-16

## 2017-11-16 VITALS
DIASTOLIC BLOOD PRESSURE: 52 MMHG | OXYGEN SATURATION: 98 % | RESPIRATION RATE: 20 BRPM | SYSTOLIC BLOOD PRESSURE: 100 MMHG | HEART RATE: 86 BPM

## 2017-11-16 LAB
ALBUMIN SERPL ELPH-MCNC: 3.1 G/DL — LOW (ref 3.3–5)
ALP SERPL-CCNC: 54 U/L — SIGNIFICANT CHANGE UP (ref 40–120)
ALT FLD-CCNC: 7 U/L — SIGNIFICANT CHANGE UP (ref 4–33)
AST SERPL-CCNC: 12 U/L — SIGNIFICANT CHANGE UP (ref 4–32)
BASOPHILS # BLD AUTO: 0.02 K/UL — SIGNIFICANT CHANGE UP (ref 0–0.2)
BASOPHILS NFR BLD AUTO: 0.2 % — SIGNIFICANT CHANGE UP (ref 0–2)
BILIRUB SERPL-MCNC: 0.4 MG/DL — SIGNIFICANT CHANGE UP (ref 0.2–1.2)
BUN SERPL-MCNC: 14 MG/DL — SIGNIFICANT CHANGE UP (ref 7–23)
CALCIUM SERPL-MCNC: 7.8 MG/DL — LOW (ref 8.4–10.5)
CHLORIDE SERPL-SCNC: 105 MMOL/L — SIGNIFICANT CHANGE UP (ref 98–107)
CO2 SERPL-SCNC: 17 MMOL/L — LOW (ref 22–31)
CREAT SERPL-MCNC: 0.55 MG/DL — SIGNIFICANT CHANGE UP (ref 0.5–1.3)
EOSINOPHIL # BLD AUTO: 0.06 K/UL — SIGNIFICANT CHANGE UP (ref 0–0.5)
EOSINOPHIL NFR BLD AUTO: 0.5 % — SIGNIFICANT CHANGE UP (ref 0–6)
GLUCOSE BLDC GLUCOMTR-MCNC: 183 MG/DL — HIGH (ref 70–99)
GLUCOSE BLDC GLUCOMTR-MCNC: 183 MG/DL — HIGH (ref 70–99)
GLUCOSE SERPL-MCNC: 218 MG/DL — HIGH (ref 70–99)
HBA1C BLD-MCNC: 9.6 % — HIGH (ref 4–5.6)
HCT VFR BLD CALC: 35.3 % — SIGNIFICANT CHANGE UP (ref 34.5–45)
HGB BLD-MCNC: 11.8 G/DL — SIGNIFICANT CHANGE UP (ref 11.5–15.5)
IMM GRANULOCYTES # BLD AUTO: 0.04 # — SIGNIFICANT CHANGE UP
IMM GRANULOCYTES NFR BLD AUTO: 0.3 % — SIGNIFICANT CHANGE UP (ref 0–1.5)
LYMPHOCYTES # BLD AUTO: 2.76 K/UL — SIGNIFICANT CHANGE UP (ref 1–3.3)
LYMPHOCYTES # BLD AUTO: 21.3 % — SIGNIFICANT CHANGE UP (ref 13–44)
MAGNESIUM SERPL-MCNC: 2.2 MG/DL — SIGNIFICANT CHANGE UP (ref 1.6–2.6)
MCHC RBC-ENTMCNC: 31.1 PG — SIGNIFICANT CHANGE UP (ref 27–34)
MCHC RBC-ENTMCNC: 33.4 % — SIGNIFICANT CHANGE UP (ref 32–36)
MCV RBC AUTO: 92.9 FL — SIGNIFICANT CHANGE UP (ref 80–100)
MONOCYTES # BLD AUTO: 0.81 K/UL — SIGNIFICANT CHANGE UP (ref 0–0.9)
MONOCYTES NFR BLD AUTO: 6.3 % — SIGNIFICANT CHANGE UP (ref 2–14)
NEUTROPHILS # BLD AUTO: 9.27 K/UL — HIGH (ref 1.8–7.4)
NEUTROPHILS NFR BLD AUTO: 71.4 % — SIGNIFICANT CHANGE UP (ref 43–77)
NRBC # FLD: 0 — SIGNIFICANT CHANGE UP
PHOSPHATE SERPL-MCNC: 3.4 MG/DL — SIGNIFICANT CHANGE UP (ref 2.5–4.5)
PLATELET # BLD AUTO: 299 K/UL — SIGNIFICANT CHANGE UP (ref 150–400)
PMV BLD: 9 FL — SIGNIFICANT CHANGE UP (ref 7–13)
POTASSIUM SERPL-MCNC: 4.2 MMOL/L — SIGNIFICANT CHANGE UP (ref 3.5–5.3)
POTASSIUM SERPL-SCNC: 4.2 MMOL/L — SIGNIFICANT CHANGE UP (ref 3.5–5.3)
PROT SERPL-MCNC: 5.7 G/DL — LOW (ref 6–8.3)
RBC # BLD: 3.8 M/UL — SIGNIFICANT CHANGE UP (ref 3.8–5.2)
RBC # FLD: 11.8 % — SIGNIFICANT CHANGE UP (ref 10.3–14.5)
SODIUM SERPL-SCNC: 138 MMOL/L — SIGNIFICANT CHANGE UP (ref 135–145)
WBC # BLD: 12.96 K/UL — HIGH (ref 3.8–10.5)
WBC # FLD AUTO: 12.96 K/UL — HIGH (ref 3.8–10.5)

## 2017-11-16 PROCEDURE — 99233 SBSQ HOSP IP/OBS HIGH 50: CPT | Mod: GC

## 2017-11-16 PROCEDURE — 99291 CRITICAL CARE FIRST HOUR: CPT

## 2017-11-16 RX ORDER — POTASSIUM PHOSPHATE, MONOBASIC POTASSIUM PHOSPHATE, DIBASIC 236; 224 MG/ML; MG/ML
15 INJECTION, SOLUTION INTRAVENOUS ONCE
Qty: 0 | Refills: 0 | Status: COMPLETED | OUTPATIENT
Start: 2017-11-16 | End: 2017-11-16

## 2017-11-16 RX ORDER — CHLORHEXIDINE GLUCONATE 213 G/1000ML
1 SOLUTION TOPICAL DAILY
Qty: 0 | Refills: 0 | Status: DISCONTINUED | OUTPATIENT
Start: 2017-11-16 | End: 2017-11-16

## 2017-11-16 RX ORDER — SODIUM,POTASSIUM PHOSPHATES 278-250MG
1 POWDER IN PACKET (EA) ORAL
Qty: 0 | Refills: 0 | Status: DISCONTINUED | OUTPATIENT
Start: 2017-11-16 | End: 2017-11-16

## 2017-11-16 RX ORDER — SODIUM,POTASSIUM PHOSPHATES 278-250MG
1 POWDER IN PACKET (EA) ORAL ONCE
Qty: 0 | Refills: 0 | Status: DISCONTINUED | OUTPATIENT
Start: 2017-11-16 | End: 2017-11-16

## 2017-11-16 RX ORDER — INSULIN LISPRO 100/ML
0 VIAL (ML) SUBCUTANEOUS
Qty: 0 | Refills: 0 | COMMUNITY

## 2017-11-16 RX ORDER — DEXTROSE 50 % IN WATER 50 %
25 SYRINGE (ML) INTRAVENOUS ONCE
Qty: 0 | Refills: 0 | Status: DISCONTINUED | OUTPATIENT
Start: 2017-11-16 | End: 2017-11-16

## 2017-11-16 RX ORDER — MAGNESIUM SULFATE 500 MG/ML
2 VIAL (ML) INJECTION ONCE
Qty: 0 | Refills: 0 | Status: COMPLETED | OUTPATIENT
Start: 2017-11-16 | End: 2017-11-16

## 2017-11-16 RX ORDER — INSULIN LISPRO 100/ML
VIAL (ML) SUBCUTANEOUS
Qty: 0 | Refills: 0 | Status: DISCONTINUED | OUTPATIENT
Start: 2017-11-16 | End: 2017-11-16

## 2017-11-16 RX ADMIN — Medication 2: at 12:25

## 2017-11-16 RX ADMIN — CHLORHEXIDINE GLUCONATE 1 APPLICATION(S): 213 SOLUTION TOPICAL at 12:28

## 2017-11-16 RX ADMIN — POTASSIUM PHOSPHATE, MONOBASIC POTASSIUM PHOSPHATE, DIBASIC 62.5 MILLIMOLE(S): 236; 224 INJECTION, SOLUTION INTRAVENOUS at 03:08

## 2017-11-16 RX ADMIN — Medication 1: at 08:22

## 2017-11-16 RX ADMIN — Medication 6 UNIT(S): at 10:05

## 2017-11-16 RX ADMIN — Medication 6 UNIT(S): at 12:44

## 2017-11-16 RX ADMIN — Medication 50 GRAM(S): at 00:45

## 2017-11-16 NOTE — DISCHARGE NOTE ADULT - PATIENT PORTAL LINK FT
“You can access the [employer] Patient Portal, offered by [facility], by registering with the following website: [email or web address]”

## 2017-11-16 NOTE — DISCHARGE NOTE ADULT - PLAN OF CARE
Resume your insulin pump You were admitted to the Medical ICU for management of Diabetic Ketoacidosis, a potentially life-threatening complication of Type 1 Diabetes in which your blood sugars rise to high levels causing symptoms such as visual changes, nausea, abdominal pain, and dehydration. While in the ICU, we provided you with IV insulin and fluids and closely monitored your electrolytes. Your condition stabilized and we were able to transition to injectable insulin. Today, we believe you are stable to go home on your insulin pump.     Please follow up with Dr. Rodriguez of Endocrinology at [phone].    Do not hesitate to return to the Emergency Department if you have any symptoms such as nausea, vomiting, abdominal pain, visual changes, or excessive urination and thirst.

## 2017-11-16 NOTE — CHART NOTE - NSCHARTNOTEFT_GEN_A_CORE
MICU Transfer Note    Transfer from: MICU  Transfer to:  (X ) Medicine    (  ) Telemetry    (  ) RCU    (  ) Palliative    (  ) Stroke Unit    (  ) _______________  Accepting physican:      MICU COURSE: 36F h/o DM1 (dx age 3-4, with [employer] pump), hypothyroidism not on synthroid, HLD, with recent insulin pump malfunction, who presented in DKA with AG28, glucose 400s, HCO3 9, mild leukocytosis, normal lactate. Hba1c 9.2. Patient was started on insulin gtt. Most recent AG is 12, patient given 20u lantus, and scheduled for 6u humalog premeals. Hba1c 9.2        ASSESSMENT & PLAN:   36F h/o DM1 (with insulin pump), hypothyroidism, HLD here with N/V/abd pain found to be in DKA.    #Neuro - mentating well, no acute issues    #CV - no active issues    #Resp - no acute issues    #GI - Carb steady meals    #Renal - Cr WNL    #ID - no active issues    #Endo - DKA  Endocrine following. Dosed 20u lantus, will continue with 6u premeal humalog and low ISS.   Will continue with basal insulin and should transition back to pump prior to discharge.   Plan for outpatient follow up with Dr. Rodriguez - [phone].    #Heme - leukocytosis in the setting of DKA    #DVT PPX - enoxaparin    #Code Status - full code        For Follow-Up:    Endocrine following. Dosed 20u lantus, will continue with 6u premeal humalog and low ISS.   Will continue with basal insulin and should transition back to pump prior to discharge.   Plan for outpatient follow up with Dr. Rodriguez - [phone].      Vital Signs Last 24 Hrs  T(C): 36.4 (15 Nov 2017 23:57), Max: 36.9 (15 Nov 2017 18:08)  T(F): 97.6 (15 Nov 2017 23:57), Max: 98.4 (15 Nov 2017 18:08)  HR: 89 (15 Nov 2017 23:57) (89 - 113)  BP: 102/51 (15 Nov 2017 23:57) (88/41 - 140/72)  BP(mean): 64 (15 Nov 2017 23:57) (52 - 82)  RR: 16 (15 Nov 2017 23:57) (15 - 18)  SpO2: 100% (15 Nov 2017 23:57) (99% - 100%)  I&O's Summary    15 Nov 2017 07:01  -  16 Nov 2017 01:49  --------------------------------------------------------  IN: 3190 mL / OUT: 700 mL / NET: 2490 mL          MEDICATIONS  (STANDING):  atorvastatin 80 milliGRAM(s) Oral at bedtime  chlorhexidine 4% Liquid 1 Application(s) Topical daily  dextrose 5%. 1000 milliLiter(s) (50 mL/Hr) IV Continuous <Continuous>  dextrose 50% Injectable 12.5 Gram(s) IV Push once  dextrose 50% Injectable 25 Gram(s) IV Push once  dextrose 50% Injectable 25 Gram(s) IV Push once  enalapril 5 milliGRAM(s) Oral daily  enoxaparin Injectable 40 milliGRAM(s) SubCutaneous every 24 hours  insulin glargine Injectable (LANTUS) 20 Unit(s) SubCutaneous at bedtime  insulin lispro (HumaLOG) corrective regimen sliding scale   SubCutaneous three times a day before meals  insulin lispro Injectable (HumaLOG) 6 Unit(s) SubCutaneous three times a day before meals  potassium acid phosphate/sodium acid phosphate tablet (K-PHOS No. 2) 1 Tablet(s) Oral once  potassium acid phosphate/sodium acid phosphate tablet (K-PHOS No. 2) 1 Tablet(s) Oral once    MEDICATIONS  (PRN):  dextrose Gel 1 Dose(s) Oral once PRN Blood Glucose LESS THAN 70 milliGRAM(s)/deciliter  glucagon  Injectable 1 milliGRAM(s) IntraMuscular once PRN Glucose LESS THAN 70 milligrams/deciliter        LABS                                            14.9                  Neurophils% (auto):   91.3   (11-15 @ 14:34):    15.52)-----------(300          Lymphocytes% (auto):  6.1                                           47.6                   Eosinphils% (auto):   0.0      Manual%: Neutrophils x    ; Lymphocytes x    ; Eosinophils x    ; Bands%: x    ; Blasts x                                    137    |  106    |  12                  Calcium: 7.4   / iCa: x      (11-15 @ 22:11)    ----------------------------<  178       Magnesium: 1.4                              3.9     |  19     |  0.47             Phosphorous: 2.1      TPro  8.7    /  Alb  4.5    /  TBili  0.3    /  DBili  x      /  AST  22     /  ALT  11     /  AlkPhos  91     15 Nov 2017 14:34    Erika Justice  PGY-1  Spectra: [phone] MICU Transfer Note    Transfer from: MICU  Transfer to:  (X ) Medicine    (  ) Telemetry    (  ) RCU    (  ) Palliative    (  ) Stroke Unit    (  ) _______________  Accepting physican:      MICU COURSE: 36F h/o DM1 (dx age 3-4, with Animus pump), hypothyroidism not on synthroid, HLD, with recent insulin pump malfunction, who presented in DKA with AG28, glucose 400s, HCO3 9, mild leukocytosis, normal lactate. Hba1c 9.2. Patient was started on insulin gtt. Most recent AG is 12, patient given 20u lantus, and scheduled for 6u humalog premeals. Hba1c 9.2        ASSESSMENT & PLAN:   36F h/o DM1 (with insulin pump), hypothyroidism, HLD here with N/V/abd pain found to be in DKA.    #Neuro - mentating well, no acute issues    #CV - no active issues    #Resp - no acute issues    #GI - Carb steady meals    #Renal - Cr WNL    #ID - no active issues    #Endo - DKA  Endocrine following. Dosed 20u lantus, will continue with 6u premeal humalog and low ISS.   Will continue with basal insulin and should transition back to pump prior to discharge.   Plan for outpatient follow up with Dr. Rodriguez - [phone].    #Heme - leukocytosis in the setting of DKA    #DVT PPX - enoxaparin    #Code Status - full code        For Follow-Up:    Endocrine following. Dosed 20u lantus, will continue with 6u premeal humalog and low ISS.   Will continue with basal insulin and should transition back to pump prior to discharge.   Plan for outpatient follow up with Dr. Michael Mistry [phone].    Pump settings:  Basal: 0.75 units/hr  ISF: 1:40  ICR: 1:7  Blood glucose target 120        Vital Signs Last 24 Hrs  T(C): 36.4 (15 Nov 2017 23:57), Max: 36.9 (15 Nov 2017 18:08)  T(F): 97.6 (15 Nov 2017 23:57), Max: 98.4 (15 Nov 2017 18:08)  HR: 89 (15 Nov 2017 23:57) (89 - 113)  BP: 102/51 (15 Nov 2017 23:57) (88/41 - 140/72)  BP(mean): 64 (15 Nov 2017 23:57) (52 - 82)  RR: 16 (15 Nov 2017 23:57) (15 - 18)  SpO2: 100% (15 Nov 2017 23:57) (99% - 100%)  I&O's Summary    15 Nov 2017 07:01  -  16 Nov 2017 01:49  --------------------------------------------------------  IN: 3190 mL / OUT: 700 mL / NET: 2490 mL          MEDICATIONS  (STANDING):  atorvastatin 80 milliGRAM(s) Oral at bedtime  chlorhexidine 4% Liquid 1 Application(s) Topical daily  dextrose 5%. 1000 milliLiter(s) (50 mL/Hr) IV Continuous <Continuous>  dextrose 50% Injectable 12.5 Gram(s) IV Push once  dextrose 50% Injectable 25 Gram(s) IV Push once  dextrose 50% Injectable 25 Gram(s) IV Push once  enalapril 5 milliGRAM(s) Oral daily  enoxaparin Injectable 40 milliGRAM(s) SubCutaneous every 24 hours  insulin glargine Injectable (LANTUS) 20 Unit(s) SubCutaneous at bedtime  insulin lispro (HumaLOG) corrective regimen sliding scale   SubCutaneous three times a day before meals  insulin lispro Injectable (HumaLOG) 6 Unit(s) SubCutaneous three times a day before meals  potassium acid phosphate/sodium acid phosphate tablet (K-PHOS No. 2) 1 Tablet(s) Oral once  potassium acid phosphate/sodium acid phosphate tablet (K-PHOS No. 2) 1 Tablet(s) Oral once    MEDICATIONS  (PRN):  dextrose Gel 1 Dose(s) Oral once PRN Blood Glucose LESS THAN 70 milliGRAM(s)/deciliter  glucagon  Injectable 1 milliGRAM(s) IntraMuscular once PRN Glucose LESS THAN 70 milligrams/deciliter        LABS                                            14.9                  Neurophils% (auto):   91.3   (11-15 @ 14:34):    15.52)-----------(300          Lymphocytes% (auto):  6.1                                           47.6                   Eosinphils% (auto):   0.0      Manual%: Neutrophils x    ; Lymphocytes x    ; Eosinophils x    ; Bands%: x    ; Blasts x                                    137    |  106    |  12                  Calcium: 7.4   / iCa: x      (11-15 @ 22:11)    ----------------------------<  178       Magnesium: 1.4                              3.9     |  19     |  0.47             Phosphorous: 2.1      TPro  8.7    /  Alb  4.5    /  TBili  0.3    /  DBili  x      /  AST  22     /  ALT  11     /  AlkPhos  91     15 Nov 2017 14:34    Erika Justice  PGY-1  Spectra: [phone] MICU Transfer Note    Transfer from: MICU  Transfer to:  (X ) Medicine    (  ) Telemetry    (  ) RCU    (  ) Palliative    (  ) Stroke Unit    (  ) _______________  Accepting physican:      MICU COURSE: 36F h/o DM1 (dx age 3-4, with Animus pump), hypothyroidism not on synthroid, HLD, with recent insulin pump malfunction, who presented in DKA with AG28, glucose 400s, HCO3 9, mild leukocytosis, normal lactate. Hba1c 9.2. Patient was started on insulin gtt. Most recent AG is 12, patient given 20u lantus, and scheduled for 6u humalog premeals. Hba1c 9.2        ASSESSMENT & PLAN:   36F h/o DM1 (with insulin pump), hypothyroidism, HLD here with N/V/abd pain found to be in DKA.    #Neuro - mentating well, no acute issues    #CV - no active issues    #Resp - no acute issues    #GI - Carb steady meals    #Renal - Cr WNL    #ID - no active issues    #Endo - DKA  Endocrine following. Dosed 20u lantus, will continue with 6u premeal humalog and low ISS.   Will continue with basal insulin and should transition back to pump prior to discharge.   Plan for outpatient follow up with Dr. Rodriguez - [phone].    #Heme - leukocytosis in the setting of DKA    #DVT PPX - enoxaparin    #Code Status - full code        For Follow-Up:    Endocrine following. Dosed 20u lantus, will continue with 6u premeal humalog and low ISS.   Will continue with basal insulin and should transition back to pump prior to discharge.   Plan for outpatient follow up with Dr. Michael Mistry [phone].    Pump settings from Endo note:  Basal: 0.75 units/hr  ISF: 1:40  ICR: 1:7  Blood glucose target 120        Vital Signs Last 24 Hrs  T(C): 36.4 (15 Nov 2017 23:57), Max: 36.9 (15 Nov 2017 18:08)  T(F): 97.6 (15 Nov 2017 23:57), Max: 98.4 (15 Nov 2017 18:08)  HR: 89 (15 Nov 2017 23:57) (89 - 113)  BP: 102/51 (15 Nov 2017 23:57) (88/41 - 140/72)  BP(mean): 64 (15 Nov 2017 23:57) (52 - 82)  RR: 16 (15 Nov 2017 23:57) (15 - 18)  SpO2: 100% (15 Nov 2017 23:57) (99% - 100%)  I&O's Summary    15 Nov 2017 07:01  -  16 Nov 2017 01:49  --------------------------------------------------------  IN: 3190 mL / OUT: 700 mL / NET: 2490 mL          MEDICATIONS  (STANDING):  atorvastatin 80 milliGRAM(s) Oral at bedtime  chlorhexidine 4% Liquid 1 Application(s) Topical daily  dextrose 5%. 1000 milliLiter(s) (50 mL/Hr) IV Continuous <Continuous>  dextrose 50% Injectable 12.5 Gram(s) IV Push once  dextrose 50% Injectable 25 Gram(s) IV Push once  dextrose 50% Injectable 25 Gram(s) IV Push once  enalapril 5 milliGRAM(s) Oral daily  enoxaparin Injectable 40 milliGRAM(s) SubCutaneous every 24 hours  insulin glargine Injectable (LANTUS) 20 Unit(s) SubCutaneous at bedtime  insulin lispro (HumaLOG) corrective regimen sliding scale   SubCutaneous three times a day before meals  insulin lispro Injectable (HumaLOG) 6 Unit(s) SubCutaneous three times a day before meals  potassium acid phosphate/sodium acid phosphate tablet (K-PHOS No. 2) 1 Tablet(s) Oral once  potassium acid phosphate/sodium acid phosphate tablet (K-PHOS No. 2) 1 Tablet(s) Oral once    MEDICATIONS  (PRN):  dextrose Gel 1 Dose(s) Oral once PRN Blood Glucose LESS THAN 70 milliGRAM(s)/deciliter  glucagon  Injectable 1 milliGRAM(s) IntraMuscular once PRN Glucose LESS THAN 70 milligrams/deciliter        LABS                                            14.9                  Neurophils% (auto):   91.3   (11-15 @ 14:34):    15.52)-----------(300          Lymphocytes% (auto):  6.1                                           47.6                   Eosinphils% (auto):   0.0      Manual%: Neutrophils x    ; Lymphocytes x    ; Eosinophils x    ; Bands%: x    ; Blasts x                                    137    |  106    |  12                  Calcium: 7.4   / iCa: x      (11-15 @ 22:11)    ----------------------------<  178       Magnesium: 1.4                              3.9     |  19     |  0.47             Phosphorous: 2.1      TPro  8.7    /  Alb  4.5    /  TBili  0.3    /  DBili  x      /  AST  22     /  ALT  11     /  AlkPhos  91     15 Nov 2017 14:34    Erika Justice  PGY-1  Spectra: [phone]

## 2017-11-16 NOTE — PROGRESS NOTE ADULT - SUBJECTIVE AND OBJECTIVE BOX
Chief Complaint: f/u DKA in patient with DM1 with [place] insulin pump    History:  Anion gap closed yesterday evening. Patient was given lantus 20 units last night and has been started on humalog 6 units before meals. Eating well. No abdominal pain, nausea, vomiting.    MEDICATIONS  (STANDING):  atorvastatin 80 milliGRAM(s) Oral at bedtime  chlorhexidine 4% Liquid 1 Application(s) Topical daily  dextrose 50% Injectable 25 Gram(s) IV Push once  enalapril 5 milliGRAM(s) Oral daily  enoxaparin Injectable 40 milliGRAM(s) SubCutaneous every 24 hours  insulin glargine Injectable (LANTUS) 20 Unit(s) SubCutaneous at bedtime  insulin lispro (HumaLOG) corrective regimen sliding scale   SubCutaneous Before meals and at bedtime  insulin lispro Injectable (HumaLOG) 6 Unit(s) SubCutaneous three times a day before meals    MEDICATIONS  (PRN):      Allergies  No Known Allergies          Review of Systems:  Constitutional: No fever  Cardiovascular: No chest pain, palpitations  Respiratory: No SOB, no cough  GI: No nausea, vomiting, abdominal pain  Endocrine: no polyuria, polydipsia    ALL OTHER SYSTEMS REVIEWED AND NEGATIVE      PHYSICAL EXAM:  VITALS: T(C): 37.2 (11-16-17 @ 12:00)  T(F): 99 (11-16-17 @ 12:00), Max: 99 (11-16-17 @ 12:00)  HR: 95 (11-16-17 @ 12:00) (88 - 113)  BP: 129/66 (11-16-17 @ 12:00) (88/41 - 140/63)  RR:  (15 - 22)  SpO2:  (95% - 100%)  Wt(kg): --  GENERAL: NAD,  well-developed  EYES: No proptosis, anicteric  HEENT:  Atraumatic, Normocephalic  RESPIRATORY: Clear to auscultation bilaterally; No rales, rhonchi, wheezing, or rubs  CARDIOVASCULAR: Regular rate and rhythm; No murmurs  GI: Soft, nontender, non distended  PSYCH: Alert and oriented x 3, reactive affec      POCT Blood Glucose.: 183 mg/dL (11-16-17 @ 12:23) H 6, 2  POCT Blood Glucose.: 183 mg/dL (11-16-17 @ 08:02) H 6, 1  POCT Blood Glucose.: 131 mg/dL (11-15-17 @ 23:57) L 20  POCT Blood Glucose.: 148 mg/dL (11-15-17 @ 22:54)  POCT Blood Glucose.: 184 mg/dL (11-15-17 @ 21:50)  POCT Blood Glucose.: 153 mg/dL (11-15-17 @ 20:47)  POCT Blood Glucose.: 146 mg/dL (11-15-17 @ 19:45)  POCT Blood Glucose.: 111 mg/dL (11-15-17 @ 18:44)  POCT Blood Glucose.: 133 mg/dL (11-15-17 @ 17:46)  POCT Blood Glucose.: 226 mg/dL (11-15-17 @ 16:36)  POCT Blood Glucose.: 410 mg/dL (11-15-17 @ 13:33)      11-16    138  |  105  |  14  ----------------------------<  218<H>  4.2   |  17<L>  |  0.55    EGFR if : 139  EGFR if non : 120    Ca    7.8<L>      11-16  Mg     2.2     11-16  Phos  3.4     11-16    TPro  5.7<L>  /  Alb  3.1<L>  /  TBili  0.4  /  DBili  x   /  AST  12  /  ALT  7   /  AlkPhos  54  11-16          Thyroid Function Tests:      Hemoglobin A1C, Whole Blood: 9.6 % <H> [4.0 - 5.6] (11-16-17 @ 05:00)

## 2017-11-16 NOTE — PROGRESS NOTE ADULT - PROBLEM SELECTOR PLAN 1
- DKA resolved, most recent anion gap 16  - on lantus 20 units qhs and humalog 6 units  - for now, c/w Humalog 6 units qac with low correction scale  - based on insulin gtt rates when anion gap closed, patient's total daily dose of insulin is 44 units  - will transition to insulin pump as follows: place pump on at 0.85 units/hr at 10 pm. Do not adminster lantus tonight.   - consistent carb diet  - will follow

## 2017-11-16 NOTE — PROGRESS NOTE ADULT - ATTENDING COMMENTS
Agree with above. Seen and examined with the resident. DKA resolved and transitioned. Restart insulin pump.
36F DM1 uncontrolled p/w DKA while using insulin pump. Now DKA resolved. Patient for dc home. Increase basal rate on pump as outlined above. Resume pump at 10pm at home. Outpatient follow up with our office scheduled for 12/4/17.

## 2017-11-16 NOTE — PROGRESS NOTE ADULT - ASSESSMENT
36F h/o DM1 (with insulin pump), hypothyroidism, HLD here with N/V/abd pain found to be in DKA.    #Neuro - mentating well, no acute issues    #CV - HDS    #Resp - no acute issues    #GI - NPO at this time    #Renal - Cr WNL    #ID - no active issues    #Endo - pt in DKA, will start on insulin drip at this time    #Heme - leukocytosis in the setting of DKA    #FEN - NPO while on insulin drip    #DVT PPX - enoxaparin    #Code Status - full code 36F h/o DM1 (with insulin pump), hypothyroidism, HLD here with N/V/abd pain found to be in DKA.    #Neuro   - A/O x 3, pleasant  - No acute issues    #CV  - Hemodynamically stable    #Respiratory  - No acute issues, satting well on RA    #GI  - On consistent carbohydrate diet w/ evening snack    #Renal  - SCr remains WNL    #ID   - No active issues  - Patient's DKA was initially thought to be due to pump issues, so suspicion was always low for infection as primary cause  - CXR and UA negative  - Will only obtain cultures if patient spikes a temp    #Endo   - DKA 2/2 pump issues in a Type 1 Diabetic  - Appreciated Endo recs: Lantus 20qHS, Humalog 6 TIDac, low dose SSI  - FS appropriate overnight, generally in 130s to 140s, one isolated spike to 184 ON    #Heme  - Leukocytosis in the setting of DKA  - Improved today, down to 12.96 from 15.52    #Diet  - On consistent carb diet with evening snack    #DVT PPX   - Lovenox    #Code Status - full code    Jc An MD  PGY-1 | Preliminary Resident  Department of Internal Medicine  Pager: [phone] | [phone] 36F h/o DM1 (with insulin pump), hypothyroidism, HLD here with N/V/abd pain found to be in DKA.    #Neuro   - A/O x 3, pleasant  - No acute issues    #CV  - Hemodynamically stable    #Respiratory  - No acute issues, satting well on RA    #GI  - On consistent carbohydrate diet w/ evening snack    #Renal  - SCr remains WNL    #ID   - No active issues  - Patient's DKA was initially thought to be due to pump issues, so suspicion was always low for infection as primary cause  - CXR and UA negative  - Will only obtain cultures if patient spikes a temp    #Endo   - DKA 2/2 pump issues in a Type 1 Diabetic  - Appreciated Endo recs: Lantus 20qHS, Humalog 6 TIDac, low dose SSI  - FS appropriate overnight, generally in 130s to 140s, one isolated spike to 184 ON  - BMPs now daily    #Heme  - Leukocytosis in the setting of DKA  - Improved today, down to 12.96 from 15.52    #Diet  - On consistent carb diet with evening snack    #DVT PPX   - Lovenox    #Code Status - full code    Jc An MD  PGY-1 | Preliminary Resident  Department of Internal Medicine  Pager: [phone] | [phone] 36F h/o DM1 (with insulin pump), hypothyroidism, HLD here with N/V/abd pain found to be in DKA.    #Neuro   - A/O x 3, pleasant  - No acute issues    #CV  - Hemodynamically stable    #Respiratory  - No acute issues, satting well on RA    #GI  - On consistent carbohydrate diet w/ evening snack    #Renal  - SCr remains WNL    #ID   - No active issues  - Patient's DKA was initially thought to be due to pump issues, so suspicion was always low for infection as primary cause  - CXR and UA negative  - Will only obtain cultures if patient spikes a temp    #Endo   - DKA 2/2 pump issues in a Type 1 Diabetic  - Appreciated Endo recs: Lantus 20qHS, Humalog 6 TIDac  - Will increase to mod SSI as patient's gap slightly increased to 16 this AM  - FS appropriate overnight, generally in 130s to 140s, one isolated spike to 184 ON  - BMPs now daily    #Heme  - Leukocytosis in the setting of DKA  - Improved today, down to 12.96 from 15.52    #Diet  - On consistent carb diet with evening snack    #DVT PPX   - Lovenox    #Code Status - full code    #Dispo:  - Bedboard, discharge    Jc An MD  PGY-1 | Preliminary Resident  Department of Internal Medicine  Pager: [phone] | [phone] 36F h/o DM1 (with insulin pump), hypothyroidism, HLD here with N/V/abd pain found to be in DKA.    #Neuro   - A/O x 3, pleasant  - No acute issues    #CV  - Hemodynamically stable    #Respiratory  - No acute issues, satting well on RA    #GI  - On consistent carbohydrate diet w/ evening snack    #Renal  - SCr remains WNL    #ID   - No active issues  - Patient's DKA was initially thought to be due to pump issues, so suspicion was always low for infection as primary cause  - CXR and UA negative  - Will only obtain cultures if patient spikes a temp    #Endo   - DKA 2/2 pump issues in a Type 1 Diabetic  - Appreciated Endo recs: Lantus 20qHS, Humalog 6 TIDac  - Will increase to mod SSI as patient's gap slightly increased to 16 this AM  - FS appropriate overnight, generally in 130s to 140s, one isolated spike to 184 ON  - BMPs now daily    #Heme  - Leukocytosis in the setting of DKA  - Improved today, down to 12.96 from 15.52    #Diet  - On consistent carb diet with evening snack    #DVT PPX   - Lovenox    #Code Status - full code    #Dispo:  - Bedboard, discharge pending resumption of home pump    Jc An MD  PGY-1 | Preliminary Resident  Department of Internal Medicine  Pager: [phone] | [phone]

## 2017-11-16 NOTE — DISCHARGE NOTE ADULT - HOSPITAL COURSE
37 y/o female PMHx significant for T1DM (diagnosed as a child; on [place] pump at home), hypothyroidism not on Synthroid, HLD presents with nausea, vomiting, and abdominal pain in the setting of known insulin pump malfunction. Patient found to be in DKA with AG28, glucose 400s, HCO3 9, mild leukocytosis, normal lactate. Hba1c 9.2. Patient was started on insulin gtt and IV hydration. Patient received a basic infectious workup including UA and CXR which were both normal. Because patient's DKA was strongly believed to be 2/2 pump malfunction instead of infection, no BCx were drawn. Patient's MICU stay was uneventful. Her vital signs and electrolytes remained acceptable throughout her stay.  On 11/15 around 22:00, patient's AG normalized to 12 and was thereafter transitioned to SQ basal/bolus insulin. Patient tolerated PO intake without issue. On 11/16 AM, patient's AG widened slightly to 16 and was increased for low to moderate insulin sliding scale. As of 11/16, patient has been deemed stable for discharge from the MICU to home once she resumes her insulin pump. Per patient, the pump is with a family member at home. If the pump cannot be brought in in a timely manner, patient can be transferred to general medicine floor and then discharged home.

## 2017-11-16 NOTE — DISCHARGE NOTE ADULT - CARE PLAN
Principal Discharge DX:	Diabetic ketoacidosis without coma associated with type 1 diabetes mellitus  Goal:	Resume your insulin pump  Instructions for follow-up, activity and diet:	You were admitted to the Medical ICU for management of Diabetic Ketoacidosis, a potentially life-threatening complication of Type 1 Diabetes in which your blood sugars rise to high levels causing symptoms such as visual changes, nausea, abdominal pain, and dehydration. While in the ICU, we provided you with IV insulin and fluids and closely monitored your electrolytes. Your condition stabilized and we were able to transition to injectable insulin. Today, we believe you are stable to go home on your insulin pump.     Please follow up with Dr. Rodriguez of Endocrinology at [phone].    Do not hesitate to return to the Emergency Department if you have any symptoms such as nausea, vomiting, abdominal pain, visual changes, or excessive urination and thirst.

## 2017-11-16 NOTE — PROGRESS NOTE ADULT - PROBLEM SELECTOR PLAN 2
- patient with resolved DKA, received 20 units of lantus last night at ~ midnight  - will place insulin pump back on tonight at 10 pm at 0.85 units/hr; can c/w ICR of 1:7 and ISF of 1:40  - consistent carb diet  - outpatient follow up with Dr. Rodriguez - [phone]

## 2017-11-16 NOTE — DISCHARGE NOTE ADULT - MEDICATION SUMMARY - MEDICATIONS TO TAKE
I will START or STAY ON the medications listed below when I get home from the hospital:    enalapril 5 mg oral tablet  -- 1 tab(s) by mouth once a day  -- Indication: For hypertension    HumaLOG Cartridge 100 units/mL subcutaneous solution  -- 0.85 units/hr; can c/w ICR of 1:7 and ISF of 1:40  HOME INSULIN PUMP  -- Indication: For Diabetes mellitus of other type with ketoacidosis without coma    atorvastatin 80 mg oral tablet  -- 1 tab(s) by mouth once a day  -- Indication: For HLD

## 2017-11-16 NOTE — PROGRESS NOTE ADULT - ASSESSMENT
35 y/o F with uncontrolled DM1, hypothyroidism, HLD, here with DKA likely due to lack of insulin delivery via pump (as pump site was not changed appropriately)

## 2017-11-16 NOTE — DISCHARGE NOTE ADULT - CONDITIONS AT DISCHARGE
Patient vital signs stable. Connected to  personal PCA pump. Patient vital signs stable. Advised by Endocrinolgy team to restart insulin pump tomorrow, 11- since lantus is still on board.

## 2017-11-16 NOTE — PROGRESS NOTE ADULT - SUBJECTIVE AND OBJECTIVE BOX
CHIEF COMPLAINT:    Interval Events:    REVIEW OF SYSTEMS:  Constitutional: [ ] negative [ ] fevers [ ] chills [ ] weight loss [ ] weight gain  HEENT: [ ] negative [ ] dry eyes [ ] eye irritation [ ] postnasal drip [ ] nasal congestion  CV: [ ] negative  [ ] chest pain [ ] orthopnea [ ] palpitations [ ] murmur  Resp: [ ] negative [ ] cough [ ] shortness of breath [ ] dyspnea [ ] wheezing [ ] sputum [ ] hemoptysis  GI: [ ] negative [ ] nausea [ ] vomiting [ ] diarrhea [ ] constipation [ ] abd pain [ ] dysphagia   : [ ] negative [ ] dysuria [ ] nocturia [ ] hematuria [ ] increased urinary frequency  Musculoskeletal: [ ] negative [ ] back pain [ ] myalgias [ ] arthralgias [ ] fracture  Skin: [ ] negative [ ] rash [ ] itch  Neurological: [ ] negative [ ] headache [ ] dizziness [ ] syncope [ ] weakness [ ] numbness  Psychiatric: [ ] negative [ ] anxiety [ ] depression  Endocrine: [ ] negative [ ] diabetes [ ] thyroid problem  Hematologic/Lymphatic: [ ] negative [ ] anemia [ ] bleeding problem  Allergic/Immunologic: [ ] negative [ ] itchy eyes [ ] nasal discharge [ ] hives [ ] angioedema  [ ] All other systems negative  [ ] Unable to assess ROS because ________    OBJECTIVE:  ICU Vital Signs Last 24 Hrs  T(C): 36.7 (2017 04:45), Max: 36.9 (15 Nov 2017 18:08)  T(F): 98 (2017 04:45), Max: 98.4 (15 Nov 2017 18:08)  HR: 90 (2017 06:02) (89 - 113)  BP: 92/49 (2017 06:02) (88/41 - 140/72)  BP(mean): 59 (2017 06:02) (52 - 82)  ABP: --  ABP(mean): --  RR: 18 (2017 06:02) (15 - 18)  SpO2: 95% (2017 06:02) (95% - 100%)        -15 @ 07:01  -  -16 @ 07:00  --------------------------------------------------------  IN: 3440 mL / OUT: 1000 mL / NET: 2440 mL      CAPILLARY BLOOD GLUCOSE      POCT Blood Glucose.: 131 mg/dL (15 Nov 2017 23:57)      PHYSICAL EXAM:  General:   HEENT:   Lymph Nodes:  Neck:   Respiratory:   Cardiovascular:   Abdomen:   Extremities:   Skin:   Neurological:  Psychiatry:    LINES:    HOSPITAL MEDICATIONS:  Standing Meds:  atorvastatin 80 milliGRAM(s) Oral at bedtime  chlorhexidine 4% Liquid 1 Application(s) Topical daily  dextrose 5%. 1000 milliLiter(s) IV Continuous <Continuous>  dextrose 50% Injectable 12.5 Gram(s) IV Push once  dextrose 50% Injectable 25 Gram(s) IV Push once  dextrose 50% Injectable 25 Gram(s) IV Push once  enalapril 5 milliGRAM(s) Oral daily  enoxaparin Injectable 40 milliGRAM(s) SubCutaneous every 24 hours  insulin glargine Injectable (LANTUS) 20 Unit(s) SubCutaneous at bedtime  insulin lispro (HumaLOG) corrective regimen sliding scale   SubCutaneous three times a day before meals  insulin lispro Injectable (HumaLOG) 6 Unit(s) SubCutaneous three times a day before meals      PRN Meds:  dextrose Gel 1 Dose(s) Oral once PRN  glucagon  Injectable 1 milliGRAM(s) IntraMuscular once PRN      LABS:                        11.8   12.96 )-----------( 299      ( 2017 05:00 )             35.3     Hgb Trend: 11.8<--, 14.9<--  11-16    138  |  105  |  14  ----------------------------<  218<H>  4.2   |  17<L>  |  0.55    Ca    7.8<L>      2017 05:00  Phos  3.4       Mg     2.2         TPro  5.7<L>  /  Alb  3.1<L>  /  TBili  0.4  /  DBili  x   /  AST  12  /  ALT  7   /  AlkPhos  54      Creatinine Trend: 0.55<--, 0.47<--, 0.59<--, 0.80<--    Urinalysis Basic - ( 15 Nov 2017 16:04 )    Color: COLORL / Appearance: CLEAR / S.028 / pH: 5.5  Gluc: >1000 / Ketone: LARGE  / Bili: NEGATIVE / Urobili: NORMAL E.U.   Blood: MODERATE / Protein: 20 / Nitrite: NEGATIVE   Leuk Esterase: NEGATIVE / RBC: >50 / WBC 2-5   Sq Epi: OCC / Non Sq Epi: x / Bacteria: x        Venous Blood Gas:  11-15 @ 18:00  7.28/39/< 24/17/26.1  VBG Lactate: 1.5  Venous Blood Gas:  11-15 @ 14:34  7.17/35/56/13/83.6  VBG Lactate: 2.0      MICROBIOLOGY:     RADIOLOGY:  [ ] Reviewed and interpreted by me    EKG: CHIEF COMPLAINT: DKA in setting of insulin pump issues    Interval Events: No acute events overnight. Patient transitioned to SQ basal/bolus insulin regimen with low dose SSI per endocrine recommendations. Patient tolerating PO intake. Patient well, sleeping comfortably on my arrival. Denies HA, visual changes, CP, SOB, polyuria/polydypsia, or abdominal pain.    REVIEW OF SYSTEMS:  Constitutional: [x] negative [ ] fevers [ ] chills [ ] weight loss [ ] weight gain  HEENT: [x] negative [ ] dry eyes [ ] eye irritation [ ] postnasal drip [ ] nasal congestion  CV: [x] negative  [ ] chest pain [ ] orthopnea [ ] palpitations [ ] murmur  Resp: [x] negative [ ] cough [ ] shortness of breath [ ] dyspnea [ ] wheezing [ ] sputum [ ] hemoptysis  GI: [x] negative [ ] nausea [ ] vomiting [ ] diarrhea [ ] constipation [ ] abd pain [ ] dysphagia   : [x] negative [ ] dysuria [ ] nocturia [ ] hematuria [ ] increased urinary frequency  Musculoskeletal: [x] negative [ ] back pain [ ] myalgias [ ] arthralgias [ ] fracture  Skin: [x] negative [ ] rash [ ] itch  Neurological: [x] negative [ ] headache [ ] dizziness [ ] syncope [ ] weakness [ ] numbness  Psychiatric: [x] negative [ ] anxiety [ ] depression  Endocrine: [ ] negative [x] diabetes [ ] thyroid problem  Hematologic/Lymphatic: [x] negative [ ] anemia [ ] bleeding problem  Allergic/Immunologic: [x] negative [ ] itchy eyes [ ] nasal discharge [ ] hives [ ] angioedema  [ ] All other systems negative  [ ] Unable to assess ROS because ________    OBJECTIVE:  ICU Vital Signs Last 24 Hrs  T(C): 36.7 (2017 04:45), Max: 36.9 (15 Nov 2017 18:08)  T(F): 98 (2017 04:45), Max: 98.4 (15 Nov 2017 18:08)  HR: 90 (2017 06:02) (89 - 113)  BP: 92/49 (2017 06:02) (88/41 - 140/72)  BP(mean): 59 (2017 06:02) (52 - 82)  ABP: --  ABP(mean): --  RR: 18 (2017 06:02) (15 - 18)  SpO2: 95% (2017 06:02) (95% - 100%)        -15 @ 07:01  -   @ 07:00  --------------------------------------------------------  IN: 3440 mL / OUT: 1000 mL / NET: 2440 mL      CAPILLARY BLOOD GLUCOSE      POCT Blood Glucose.: 131 mg/dL (15 Nov 2017 23:57)      PHYSICAL EXAM:  General: Laying in bed, pleasant, in NAD  HEENT: NCAT, moist oral mucosa  Neck: Supple  Respiratory: CTABL  Cardiovascular: Soft, I/VI systolic murmur RUSB  Abdomen: Soft, non-tender, +BS  Extremities: No LE swelling  Skin: Unremarkable  Neurological: A/O x 3, non-focal  Psychiatry: Appropriate mood and affect    LINES:    HOSPITAL MEDICATIONS:  Standing Meds:  atorvastatin 80 milliGRAM(s) Oral at bedtime  chlorhexidine 4% Liquid 1 Application(s) Topical daily  dextrose 5%. 1000 milliLiter(s) IV Continuous <Continuous>  dextrose 50% Injectable 12.5 Gram(s) IV Push once  dextrose 50% Injectable 25 Gram(s) IV Push once  dextrose 50% Injectable 25 Gram(s) IV Push once  enalapril 5 milliGRAM(s) Oral daily  enoxaparin Injectable 40 milliGRAM(s) SubCutaneous every 24 hours  insulin glargine Injectable (LANTUS) 20 Unit(s) SubCutaneous at bedtime  insulin lispro (HumaLOG) corrective regimen sliding scale   SubCutaneous three times a day before meals  insulin lispro Injectable (HumaLOG) 6 Unit(s) SubCutaneous three times a day before meals      PRN Meds:  dextrose Gel 1 Dose(s) Oral once PRN  glucagon  Injectable 1 milliGRAM(s) IntraMuscular once PRN      LABS:                        11.8   12.96 )-----------( 299      ( 2017 05:00 )             35.3     Hgb Trend: 11.8<--, 14.9<--  16    138  |  105  |  14  ----------------------------<  218<H>  4.2   |  17<L>  |  0.55    Ca    7.8<L>      2017 05:00  Phos  3.4       Mg     2.2         TPro  5.7<L>  /  Alb  3.1<L>  /  TBili  0.4  /  DBili  x   /  AST  12  /  ALT  7   /  AlkPhos  54      Creatinine Trend: 0.55<--, 0.47<--, 0.59<--, 0.80<--    Urinalysis Basic - ( 15 Nov 2017 16:04 )    Color: COLORL / Appearance: CLEAR / S.028 / pH: 5.5  Gluc: >1000 / Ketone: LARGE  / Bili: NEGATIVE / Urobili: NORMAL E.U.   Blood: MODERATE / Protein: 20 / Nitrite: NEGATIVE   Leuk Esterase: NEGATIVE / RBC: >50 / WBC 2-5   Sq Epi: OCC / Non Sq Epi: x / Bacteria: x        Venous Blood Gas:  11-15 @ 18:00  7.28/39/< 24/17/26.1  VBG Lactate: 1.5  Venous Blood Gas:  11-15 @ 14:34  7.17/35/56/13/83.6  VBG Lactate: 2.0      MICROBIOLOGY:     Urinalysis (11.15.17 @ 16:04)    Color: COLORL    Urine Appearance: CLEAR    Glucose: >1000    Bilirubin: NEGATIVE    Ketone - Urine: LARGE    Specific Gravity: 1.028    Blood: MODERATE    pH - Urine: 5.5    Protein, Urine: 20    Urobilinogen: NORMAL E.U.    Nitrite: NEGATIVE    Leukocyte Esterase Concentration: NEGATIVE    Red Blood Cell - Urine: >50    White Blood Cell - Urine: 2-5    Squamous Epithelial: OCC        RADIOLOGY:  < from: Xray Chest 2 Views PA/Lat (11.15.17 @ 15:05) >  IMPRESSION:  Clear lungs. No pleural effusions or pneumothorax.    Cardiac and mediastinal silhouettes within normal limits.    Trachea midline.    Unremarkable osseous structures.    EULALIA LARA M.D., ATTENDING RADIOLOGIST  This document has been electronically signed. Nov 15 2017  5:03PM          < end of copied text >      EKG:

## 2017-11-17 ENCOUNTER — CLINICAL ADVICE (OUTPATIENT)
Age: 37
End: 2017-11-17

## 2017-12-04 ENCOUNTER — APPOINTMENT (OUTPATIENT)
Dept: ENDOCRINOLOGY | Facility: CLINIC | Age: 37
End: 2017-12-04
Payer: MEDICAID

## 2017-12-04 PROCEDURE — G0108 DIAB MANAGE TRN  PER INDIV: CPT

## 2017-12-04 PROCEDURE — 95251 CONT GLUC MNTR ANALYSIS I&R: CPT

## 2017-12-05 RX ORDER — LANCETS 33 GAUGE
EACH MISCELLANEOUS
Qty: 4 | Refills: 3 | Status: ACTIVE | COMMUNITY
Start: 2017-12-04

## 2017-12-05 RX ORDER — URINE ACETONE TEST STRIPS
STRIP MISCELLANEOUS
Qty: 1 | Refills: 1 | Status: ACTIVE | COMMUNITY
Start: 2017-12-04

## 2018-02-05 ENCOUNTER — APPOINTMENT (OUTPATIENT)
Dept: ENDOCRINOLOGY | Facility: CLINIC | Age: 38
End: 2018-02-05
Payer: MEDICAID

## 2018-02-05 VITALS
HEIGHT: 55 IN | DIASTOLIC BLOOD PRESSURE: 62 MMHG | OXYGEN SATURATION: 98 % | WEIGHT: 91 LBS | SYSTOLIC BLOOD PRESSURE: 92 MMHG | HEART RATE: 98 BPM | BODY MASS INDEX: 21.06 KG/M2

## 2018-02-05 DIAGNOSIS — E03.9 HYPOTHYROIDISM, UNSPECIFIED: ICD-10-CM

## 2018-02-05 LAB
GLUCOSE BLDC GLUCOMTR-MCNC: 274
HBA1C MFR BLD HPLC: 8.5

## 2018-02-05 PROCEDURE — 99215 OFFICE O/P EST HI 40 MIN: CPT | Mod: 25

## 2018-02-05 PROCEDURE — 82962 GLUCOSE BLOOD TEST: CPT

## 2018-02-05 PROCEDURE — 83036 HEMOGLOBIN GLYCOSYLATED A1C: CPT | Mod: QW

## 2018-02-07 ENCOUNTER — RX RENEWAL (OUTPATIENT)
Age: 38
End: 2018-02-07

## 2018-02-07 LAB
25(OH)D3 SERPL-MCNC: 9.8 NG/ML
ALBUMIN SERPL ELPH-MCNC: 4.1 G/DL
ALP BLD-CCNC: 93 U/L
ALT SERPL-CCNC: 14 U/L
ANION GAP SERPL CALC-SCNC: 15 MMOL/L
AST SERPL-CCNC: 19 U/L
BASOPHILS # BLD AUTO: 0.03 K/UL
BASOPHILS NFR BLD AUTO: 0.3 %
BILIRUB SERPL-MCNC: 0.2 MG/DL
BUN SERPL-MCNC: 16 MG/DL
CALCIUM SERPL-MCNC: 9.4 MG/DL
CHLORIDE SERPL-SCNC: 98 MMOL/L
CHOLEST SERPL-MCNC: 234 MG/DL
CHOLEST/HDLC SERPL: 2.7 RATIO
CO2 SERPL-SCNC: 24 MMOL/L
CREAT SERPL-MCNC: 0.56 MG/DL
CREAT SPEC-SCNC: 86 MG/DL
EOSINOPHIL # BLD AUTO: 0.09 K/UL
EOSINOPHIL NFR BLD AUTO: 1 %
GLUCOSE SERPL-MCNC: 288 MG/DL
HBA1C MFR BLD HPLC: 8.6 %
HCT VFR BLD CALC: 36.7 %
HDLC SERPL-MCNC: 86 MG/DL
HGB BLD-MCNC: 11.6 G/DL
IMM GRANULOCYTES NFR BLD AUTO: 0.1 %
LDLC SERPL CALC-MCNC: 121 MG/DL
LYMPHOCYTES # BLD AUTO: 3.72 K/UL
LYMPHOCYTES NFR BLD AUTO: 41.2 %
MAN DIFF?: NORMAL
MCHC RBC-ENTMCNC: 30.2 PG
MCHC RBC-ENTMCNC: 31.6 GM/DL
MCV RBC AUTO: 95.6 FL
MICROALBUMIN 24H UR DL<=1MG/L-MCNC: 10.6 MG/DL
MICROALBUMIN/CREAT 24H UR-RTO: 123 MG/G
MONOCYTES # BLD AUTO: 0.52 K/UL
MONOCYTES NFR BLD AUTO: 5.8 %
NEUTROPHILS # BLD AUTO: 4.66 K/UL
NEUTROPHILS NFR BLD AUTO: 51.6 %
PLATELET # BLD AUTO: 350 K/UL
POTASSIUM SERPL-SCNC: 4 MMOL/L
PROT SERPL-MCNC: 7.2 G/DL
RBC # BLD: 3.84 M/UL
RBC # FLD: 12.5 %
SODIUM SERPL-SCNC: 137 MMOL/L
T3 SERPL-MCNC: 96 NG/DL
T4 FREE SERPL-MCNC: 1.2 NG/DL
THYROGLOB AB SERPL-ACNC: <20 IU/ML
THYROPEROXIDASE AB SERPL IA-ACNC: 12 IU/ML
TRIGL SERPL-MCNC: 137 MG/DL
TSH SERPL-ACNC: 2.16 UIU/ML
TTG IGA SER IA-ACNC: 6.3 UNITS
TTG IGA SER-ACNC: NEGATIVE
WBC # FLD AUTO: 9.03 K/UL

## 2018-02-22 ENCOUNTER — APPOINTMENT (OUTPATIENT)
Dept: ENDOCRINOLOGY | Facility: CLINIC | Age: 38
End: 2018-02-22

## 2018-03-09 ENCOUNTER — RX RENEWAL (OUTPATIENT)
Age: 38
End: 2018-03-09

## 2018-04-23 ENCOUNTER — RX RENEWAL (OUTPATIENT)
Age: 38
End: 2018-04-23

## 2018-05-14 ENCOUNTER — APPOINTMENT (OUTPATIENT)
Dept: ENDOCRINOLOGY | Facility: CLINIC | Age: 38
End: 2018-05-14
Payer: MEDICAID

## 2018-05-14 VITALS
OXYGEN SATURATION: 98 % | DIASTOLIC BLOOD PRESSURE: 60 MMHG | WEIGHT: 91 LBS | HEART RATE: 100 BPM | BODY MASS INDEX: 21.06 KG/M2 | SYSTOLIC BLOOD PRESSURE: 90 MMHG | HEIGHT: 55 IN

## 2018-05-14 DIAGNOSIS — E10.65 TYPE 1 DIABETES MELLITUS WITH HYPERGLYCEMIA: ICD-10-CM

## 2018-05-14 DIAGNOSIS — E55.9 VITAMIN D DEFICIENCY, UNSPECIFIED: ICD-10-CM

## 2018-05-14 LAB
GLUCOSE BLDC GLUCOMTR-MCNC: 306
HBA1C MFR BLD HPLC: 8.4

## 2018-05-14 PROCEDURE — 82962 GLUCOSE BLOOD TEST: CPT

## 2018-05-14 PROCEDURE — 83036 HEMOGLOBIN GLYCOSYLATED A1C: CPT | Mod: QW

## 2018-05-14 PROCEDURE — 99215 OFFICE O/P EST HI 40 MIN: CPT | Mod: 25

## 2018-05-29 ENCOUNTER — RX RENEWAL (OUTPATIENT)
Age: 38
End: 2018-05-29

## 2018-05-29 RX ORDER — ERGOCALCIFEROL 1.25 MG/1
1.25 MG CAPSULE, LIQUID FILLED ORAL
Qty: 8 | Refills: 1 | Status: ACTIVE | COMMUNITY
Start: 2018-02-07 | End: 1900-01-01

## 2018-06-06 ENCOUNTER — APPOINTMENT (OUTPATIENT)
Dept: ENDOCRINOLOGY | Facility: CLINIC | Age: 38
End: 2018-06-06

## 2018-06-08 ENCOUNTER — APPOINTMENT (OUTPATIENT)
Dept: ENDOCRINOLOGY | Facility: CLINIC | Age: 38
End: 2018-06-08
Payer: MEDICAID

## 2018-06-08 PROCEDURE — 95251 CONT GLUC MNTR ANALYSIS I&R: CPT

## 2018-06-08 RX ORDER — INSULIN ASPART 100 [IU]/ML
100 INJECTION, SOLUTION INTRAVENOUS; SUBCUTANEOUS
Qty: 1 | Refills: 0 | Status: DISCONTINUED | COMMUNITY
Start: 2018-02-05 | End: 2018-06-08

## 2018-06-08 RX ORDER — INSULIN LISPRO 100 [IU]/ML
100 INJECTION, SOLUTION INTRAVENOUS; SUBCUTANEOUS
Qty: 9 | Refills: 3 | Status: ACTIVE | COMMUNITY
Start: 2018-06-08

## 2018-07-10 ENCOUNTER — RX RENEWAL (OUTPATIENT)
Age: 38
End: 2018-07-10

## 2018-07-27 ENCOUNTER — RX RENEWAL (OUTPATIENT)
Age: 38
End: 2018-07-27

## 2018-07-27 RX ORDER — BLOOD SUGAR DIAGNOSTIC
STRIP MISCELLANEOUS
Qty: 2 | Refills: 3 | Status: ACTIVE | COMMUNITY
Start: 2018-05-14 | End: 1900-01-01

## 2018-09-24 ENCOUNTER — APPOINTMENT (OUTPATIENT)
Dept: ENDOCRINOLOGY | Facility: CLINIC | Age: 38
End: 2018-09-24

## 2024-06-06 NOTE — H&P ADULT - NSHPPOAPULMEMBOLUS_GEN_A_CORE
Ozempic Approved    Filling Pharmacy: [facility]  Pharmacy contacted - received paid claim for $24.99 copay. Pharmacy will contact patient when medication is ready to be picked up.       no